# Patient Record
Sex: MALE | Race: WHITE | Employment: PART TIME | URBAN - METROPOLITAN AREA
[De-identification: names, ages, dates, MRNs, and addresses within clinical notes are randomized per-mention and may not be internally consistent; named-entity substitution may affect disease eponyms.]

---

## 2018-04-09 ENCOUNTER — OFFICE VISIT (OUTPATIENT)
Dept: CARDIOLOGY CLINIC | Age: 73
End: 2018-04-09
Payer: MEDICARE

## 2018-04-09 VITALS
SYSTOLIC BLOOD PRESSURE: 110 MMHG | HEIGHT: 68 IN | HEART RATE: 63 BPM | DIASTOLIC BLOOD PRESSURE: 70 MMHG | RESPIRATION RATE: 16 BRPM | WEIGHT: 193 LBS | BODY MASS INDEX: 29.25 KG/M2

## 2018-04-09 DIAGNOSIS — I25.10 CORONARY ARTERY DISEASE INVOLVING NATIVE CORONARY ARTERY OF NATIVE HEART WITHOUT ANGINA PECTORIS: Primary | Chronic | ICD-10-CM

## 2018-04-09 PROCEDURE — 99213 OFFICE O/P EST LOW 20 MIN: CPT | Performed by: INTERNAL MEDICINE

## 2018-04-09 PROCEDURE — 1036F TOBACCO NON-USER: CPT | Performed by: INTERNAL MEDICINE

## 2018-04-09 PROCEDURE — G8419 CALC BMI OUT NRM PARAM NOF/U: HCPCS | Performed by: INTERNAL MEDICINE

## 2018-04-09 PROCEDURE — 1123F ACP DISCUSS/DSCN MKR DOCD: CPT | Performed by: INTERNAL MEDICINE

## 2018-04-09 PROCEDURE — G8427 DOCREV CUR MEDS BY ELIG CLIN: HCPCS | Performed by: INTERNAL MEDICINE

## 2018-04-09 PROCEDURE — G8598 ASA/ANTIPLAT THER USED: HCPCS | Performed by: INTERNAL MEDICINE

## 2018-04-09 PROCEDURE — 93000 ELECTROCARDIOGRAM COMPLETE: CPT | Performed by: INTERNAL MEDICINE

## 2018-04-09 PROCEDURE — 4040F PNEUMOC VAC/ADMIN/RCVD: CPT | Performed by: INTERNAL MEDICINE

## 2018-04-09 PROCEDURE — 3017F COLORECTAL CA SCREEN DOC REV: CPT | Performed by: INTERNAL MEDICINE

## 2018-04-09 RX ORDER — NITROGLYCERIN 0.4 MG/1
0.4 TABLET SUBLINGUAL EVERY 5 MIN PRN
Qty: 25 TABLET | Refills: 3 | Status: SHIPPED | OUTPATIENT
Start: 2018-04-09 | End: 2019-05-13 | Stop reason: SDUPTHER

## 2018-04-09 RX ORDER — ASCORBIC ACID 500 MG
500 TABLET ORAL DAILY
COMMUNITY

## 2018-04-09 RX ORDER — CLOPIDOGREL BISULFATE 75 MG/1
75 TABLET ORAL DAILY
Qty: 90 TABLET | Refills: 3 | Status: SHIPPED | OUTPATIENT
Start: 2018-04-09 | End: 2019-02-11 | Stop reason: SDUPTHER

## 2018-04-09 RX ORDER — CARVEDILOL 3.12 MG/1
3.12 TABLET ORAL 2 TIMES DAILY WITH MEALS
Qty: 180 TABLET | Refills: 3 | Status: SHIPPED | OUTPATIENT
Start: 2018-04-09 | End: 2019-02-11 | Stop reason: SDUPTHER

## 2018-11-13 ENCOUNTER — OFFICE VISIT (OUTPATIENT)
Dept: CARDIOLOGY CLINIC | Age: 73
End: 2018-11-13
Payer: MEDICARE

## 2018-11-13 VITALS
DIASTOLIC BLOOD PRESSURE: 62 MMHG | HEIGHT: 68 IN | BODY MASS INDEX: 30.81 KG/M2 | HEART RATE: 65 BPM | WEIGHT: 203.3 LBS | RESPIRATION RATE: 18 BRPM | SYSTOLIC BLOOD PRESSURE: 108 MMHG

## 2018-11-13 DIAGNOSIS — I25.10 CORONARY ARTERY DISEASE INVOLVING NATIVE CORONARY ARTERY OF NATIVE HEART WITHOUT ANGINA PECTORIS: Primary | Chronic | ICD-10-CM

## 2018-11-13 PROCEDURE — 1123F ACP DISCUSS/DSCN MKR DOCD: CPT | Performed by: INTERNAL MEDICINE

## 2018-11-13 PROCEDURE — 93000 ELECTROCARDIOGRAM COMPLETE: CPT | Performed by: INTERNAL MEDICINE

## 2018-11-13 PROCEDURE — 1101F PT FALLS ASSESS-DOCD LE1/YR: CPT | Performed by: INTERNAL MEDICINE

## 2018-11-13 PROCEDURE — 4040F PNEUMOC VAC/ADMIN/RCVD: CPT | Performed by: INTERNAL MEDICINE

## 2018-11-13 PROCEDURE — 1036F TOBACCO NON-USER: CPT | Performed by: INTERNAL MEDICINE

## 2018-11-13 PROCEDURE — G8484 FLU IMMUNIZE NO ADMIN: HCPCS | Performed by: INTERNAL MEDICINE

## 2018-11-13 PROCEDURE — G8598 ASA/ANTIPLAT THER USED: HCPCS | Performed by: INTERNAL MEDICINE

## 2018-11-13 PROCEDURE — 99213 OFFICE O/P EST LOW 20 MIN: CPT | Performed by: INTERNAL MEDICINE

## 2018-11-13 PROCEDURE — G8427 DOCREV CUR MEDS BY ELIG CLIN: HCPCS | Performed by: INTERNAL MEDICINE

## 2018-11-13 PROCEDURE — G8417 CALC BMI ABV UP PARAM F/U: HCPCS | Performed by: INTERNAL MEDICINE

## 2018-11-13 PROCEDURE — 3017F COLORECTAL CA SCREEN DOC REV: CPT | Performed by: INTERNAL MEDICINE

## 2019-01-23 ENCOUNTER — HOSPITAL ENCOUNTER (OUTPATIENT)
Age: 74
Discharge: HOME | End: 2019-01-25
Payer: MEDICARE

## 2019-01-23 ENCOUNTER — HOSPITAL ENCOUNTER (OUTPATIENT)
Dept: GENERAL RADIOLOGY | Age: 74
Discharge: HOME | End: 2019-01-25
Payer: MEDICARE

## 2019-01-23 DIAGNOSIS — R05.9 COUGH: ICD-10-CM

## 2019-01-23 DIAGNOSIS — T78.40XA ALLERGY, INITIAL ENCOUNTER: ICD-10-CM

## 2019-01-23 PROCEDURE — 71046 X-RAY EXAM CHEST 2 VIEWS: CPT

## 2019-02-12 RX ORDER — CARVEDILOL 3.12 MG/1
TABLET ORAL
Qty: 180 TABLET | Refills: 3 | Status: SHIPPED | OUTPATIENT
Start: 2019-02-12 | End: 2019-12-02 | Stop reason: SDUPTHER

## 2019-02-12 RX ORDER — CLOPIDOGREL BISULFATE 75 MG/1
TABLET ORAL
Qty: 90 TABLET | Refills: 3 | Status: SHIPPED | OUTPATIENT
Start: 2019-02-12 | End: 2019-12-02 | Stop reason: SDUPTHER

## 2019-05-13 ENCOUNTER — OFFICE VISIT (OUTPATIENT)
Dept: CARDIOLOGY CLINIC | Age: 74
End: 2019-05-13
Payer: MEDICARE

## 2019-05-13 VITALS
BODY MASS INDEX: 31.78 KG/M2 | HEART RATE: 58 BPM | DIASTOLIC BLOOD PRESSURE: 78 MMHG | RESPIRATION RATE: 16 BRPM | WEIGHT: 209.7 LBS | HEIGHT: 68 IN | SYSTOLIC BLOOD PRESSURE: 128 MMHG

## 2019-05-13 DIAGNOSIS — I25.119 CORONARY ARTERY DISEASE WITH ANGINA PECTORIS, UNSPECIFIED VESSEL OR LESION TYPE, UNSPECIFIED WHETHER NATIVE OR TRANSPLANTED HEART (HCC): Primary | Chronic | ICD-10-CM

## 2019-05-13 PROCEDURE — 99213 OFFICE O/P EST LOW 20 MIN: CPT | Performed by: INTERNAL MEDICINE

## 2019-05-13 PROCEDURE — 1036F TOBACCO NON-USER: CPT | Performed by: INTERNAL MEDICINE

## 2019-05-13 PROCEDURE — G8417 CALC BMI ABV UP PARAM F/U: HCPCS | Performed by: INTERNAL MEDICINE

## 2019-05-13 PROCEDURE — 1123F ACP DISCUSS/DSCN MKR DOCD: CPT | Performed by: INTERNAL MEDICINE

## 2019-05-13 PROCEDURE — G8427 DOCREV CUR MEDS BY ELIG CLIN: HCPCS | Performed by: INTERNAL MEDICINE

## 2019-05-13 PROCEDURE — G8598 ASA/ANTIPLAT THER USED: HCPCS | Performed by: INTERNAL MEDICINE

## 2019-05-13 PROCEDURE — 93000 ELECTROCARDIOGRAM COMPLETE: CPT | Performed by: INTERNAL MEDICINE

## 2019-05-13 PROCEDURE — 4040F PNEUMOC VAC/ADMIN/RCVD: CPT | Performed by: INTERNAL MEDICINE

## 2019-05-13 PROCEDURE — 3017F COLORECTAL CA SCREEN DOC REV: CPT | Performed by: INTERNAL MEDICINE

## 2019-05-13 RX ORDER — NITROGLYCERIN 0.4 MG/1
0.4 TABLET SUBLINGUAL EVERY 5 MIN PRN
Qty: 25 TABLET | Refills: 3 | Status: SHIPPED
Start: 2019-05-13 | End: 2020-09-08 | Stop reason: SDUPTHER

## 2019-05-13 NOTE — PROGRESS NOTES
Relationship status: None    Intimate partner violence:     Fear of current or ex partner: None     Emotionally abused: None     Physically abused: None     Forced sexual activity: None   Other Topics Concern    None   Social History Narrative    None       Current Outpatient Medications   Medication Sig Dispense Refill    nitroGLYCERIN (NITROSTAT) 0.4 MG SL tablet Place 1 tablet under the tongue every 5 minutes as needed (CP) 25 tablet 3    clopidogrel (PLAVIX) 75 MG tablet TAKE 1 TABLET EVERY DAY 90 tablet 3    carvedilol (COREG) 3.125 MG tablet TAKE 1 TABLET TWICE DAILY WITH MEALS 180 tablet 3    vitamin C (ASCORBIC ACID) 500 MG tablet Take 500 mg by mouth daily      Lactobacillus (PROBIOTIC ACIDOPHILUS PO) Take by mouth      vitamin D (CHOLECALCIFEROL) 1000 UNIT TABS tablet Take 1,000 Units by mouth daily      aspirin 81 MG tablet Take 1 tablet by mouth daily LD 2/12/16 per surgeon (Patient taking differently: Take 81 mg by mouth daily Takes nightly) 30 tablet 0    Multiple Vitamin TABS Take by mouth      LIPITOR 40 MG tablet TAKE ONE (1) TABLET ONCE DAILY. 90 tablet 3    Flaxseed, Linseed, (FLAX SEED OIL PO) Take 1,000 mg by mouth 2 times daily LD 2/15/16      doxycycline hyclate (VIBRA-TABS) 100 MG tablet TAKE ONE TABLET BY MOUTH TWO TIMES A DAY  0    vitamin E 1000 UNITS capsule Take 1,000 Units by mouth daily LD 2/15/16       No current facility-administered medications for this visit. No Known Allergies    Chief Complaint:  Jailene Velez is here today for follow up and management/recomendations for CAD, ICM, hypercholesterolemia. History of Present Illness: Jailene Velez states that He does house work, yard work, goes up the stairs & goes shopping. He also walks his dog for a mile. He denies any CP or OLSON with any of these activities. He denies any orthopnea/PND. He denies any presyncopal symptoms.   He had one episode 2 months ago where after carrying a 60 pound bag 30 patient's care.       [address], [address] [facility]  Interventional Cardiology

## 2019-05-29 ENCOUNTER — HOSPITAL ENCOUNTER (OUTPATIENT)
Dept: GENERAL RADIOLOGY | Age: 74
Discharge: HOME | End: 2019-05-31
Payer: MEDICARE

## 2019-05-29 ENCOUNTER — HOSPITAL ENCOUNTER (OUTPATIENT)
Dept: ULTRASOUND IMAGING | Age: 74
Discharge: HOME | End: 2019-05-31
Payer: MEDICARE

## 2019-05-29 ENCOUNTER — HOSPITAL ENCOUNTER (OUTPATIENT)
Age: 74
Discharge: HOME | End: 2019-05-31
Payer: MEDICARE

## 2019-05-29 DIAGNOSIS — R60.0 LOCALIZED EDEMA: ICD-10-CM

## 2019-05-29 DIAGNOSIS — M79.605 LEFT LEG PAIN: ICD-10-CM

## 2019-05-29 DIAGNOSIS — S80.12XA CONTUSION OF LEFT LOWER EXTREMITY, INITIAL ENCOUNTER: ICD-10-CM

## 2019-05-29 DIAGNOSIS — M79.605 PAIN OF LEFT LEG: ICD-10-CM

## 2019-05-29 PROCEDURE — 73590 X-RAY EXAM OF LOWER LEG: CPT

## 2019-05-29 PROCEDURE — 93971 EXTREMITY STUDY: CPT

## 2019-10-30 ENCOUNTER — TELEPHONE (OUTPATIENT)
Dept: ADMINISTRATIVE | Age: 74
End: 2019-10-30

## 2019-12-02 RX ORDER — CARVEDILOL 3.12 MG/1
TABLET ORAL
Qty: 180 TABLET | Refills: 3 | Status: SHIPPED
Start: 2019-12-02 | End: 2020-09-16

## 2019-12-02 RX ORDER — CLOPIDOGREL BISULFATE 75 MG/1
TABLET ORAL
Qty: 90 TABLET | Refills: 3 | Status: SHIPPED
Start: 2019-12-02 | End: 2020-09-16

## 2019-12-12 ENCOUNTER — OFFICE VISIT (OUTPATIENT)
Dept: CARDIOLOGY CLINIC | Age: 74
End: 2019-12-12
Payer: MEDICARE

## 2019-12-12 VITALS
HEART RATE: 64 BPM | WEIGHT: 205.2 LBS | RESPIRATION RATE: 18 BRPM | BODY MASS INDEX: 31.1 KG/M2 | DIASTOLIC BLOOD PRESSURE: 70 MMHG | SYSTOLIC BLOOD PRESSURE: 116 MMHG | HEIGHT: 68 IN

## 2019-12-12 DIAGNOSIS — I25.119 CORONARY ARTERY DISEASE WITH ANGINA PECTORIS, UNSPECIFIED VESSEL OR LESION TYPE, UNSPECIFIED WHETHER NATIVE OR TRANSPLANTED HEART (HCC): Primary | Chronic | ICD-10-CM

## 2019-12-12 PROCEDURE — G8484 FLU IMMUNIZE NO ADMIN: HCPCS | Performed by: INTERNAL MEDICINE

## 2019-12-12 PROCEDURE — G8427 DOCREV CUR MEDS BY ELIG CLIN: HCPCS | Performed by: INTERNAL MEDICINE

## 2019-12-12 PROCEDURE — 3017F COLORECTAL CA SCREEN DOC REV: CPT | Performed by: INTERNAL MEDICINE

## 2019-12-12 PROCEDURE — 1123F ACP DISCUSS/DSCN MKR DOCD: CPT | Performed by: INTERNAL MEDICINE

## 2019-12-12 PROCEDURE — G8598 ASA/ANTIPLAT THER USED: HCPCS | Performed by: INTERNAL MEDICINE

## 2019-12-12 PROCEDURE — G8417 CALC BMI ABV UP PARAM F/U: HCPCS | Performed by: INTERNAL MEDICINE

## 2019-12-12 PROCEDURE — 1036F TOBACCO NON-USER: CPT | Performed by: INTERNAL MEDICINE

## 2019-12-12 PROCEDURE — 99213 OFFICE O/P EST LOW 20 MIN: CPT | Performed by: INTERNAL MEDICINE

## 2019-12-12 PROCEDURE — 93000 ELECTROCARDIOGRAM COMPLETE: CPT | Performed by: INTERNAL MEDICINE

## 2019-12-12 PROCEDURE — 4040F PNEUMOC VAC/ADMIN/RCVD: CPT | Performed by: INTERNAL MEDICINE

## 2020-09-08 ENCOUNTER — OFFICE VISIT (OUTPATIENT)
Dept: CARDIOLOGY CLINIC | Age: 75
End: 2020-09-08
Payer: MEDICARE

## 2020-09-08 VITALS
BODY MASS INDEX: 30.62 KG/M2 | HEIGHT: 68 IN | HEART RATE: 65 BPM | RESPIRATION RATE: 16 BRPM | WEIGHT: 202 LBS | DIASTOLIC BLOOD PRESSURE: 64 MMHG | SYSTOLIC BLOOD PRESSURE: 102 MMHG

## 2020-09-08 PROCEDURE — 1123F ACP DISCUSS/DSCN MKR DOCD: CPT | Performed by: INTERNAL MEDICINE

## 2020-09-08 PROCEDURE — G8417 CALC BMI ABV UP PARAM F/U: HCPCS | Performed by: INTERNAL MEDICINE

## 2020-09-08 PROCEDURE — G8427 DOCREV CUR MEDS BY ELIG CLIN: HCPCS | Performed by: INTERNAL MEDICINE

## 2020-09-08 PROCEDURE — 3017F COLORECTAL CA SCREEN DOC REV: CPT | Performed by: INTERNAL MEDICINE

## 2020-09-08 PROCEDURE — 4040F PNEUMOC VAC/ADMIN/RCVD: CPT | Performed by: INTERNAL MEDICINE

## 2020-09-08 PROCEDURE — 99214 OFFICE O/P EST MOD 30 MIN: CPT | Performed by: INTERNAL MEDICINE

## 2020-09-08 PROCEDURE — 1036F TOBACCO NON-USER: CPT | Performed by: INTERNAL MEDICINE

## 2020-09-08 PROCEDURE — 93000 ELECTROCARDIOGRAM COMPLETE: CPT | Performed by: INTERNAL MEDICINE

## 2020-09-08 RX ORDER — NITROGLYCERIN 0.4 MG/1
0.4 TABLET SUBLINGUAL EVERY 5 MIN PRN
Qty: 25 TABLET | Refills: 3 | Status: SHIPPED
Start: 2020-09-08 | End: 2021-05-05

## 2020-09-08 NOTE — PROGRESS NOTES
Nehemiah Lucas [facility]  1945  Date of Service: 9/8/2020    Patient Active Problem List    Diagnosis Date Noted    Mixed hyperlipidemia 06/04/2013    CAD (coronary artery disease)      Overview Note:     A. Non Q-wave inferoposterior myocardial infarction (6/7/08). B. Cardiac catheterization (6/7/08). LAD proximal 80%, distal 99%. D1 1.8 mm vessel ostial 99%. Circumflex distal 80%. OM 3 ostial 100%. RCA distal 99% at the bifurcation of PDA posterior lateral branch. C. Urgent coronary artery bypass surgery by Dr. Shilpi Fan (6/7/08). LIMA to the LAD. Heart cath (11-12-12)  LIMA - LAD patent  SVG - OM2: patent  SVG - PLB: occluded  SVG - PDA: occluded  Apical LAD: 85 & 95% lesions (1.5 mm vessel)    2.5x14 Resolute MARQUES distal RCA  SVG to the OM3. SVG to the PDA. SVG to the posterior lateral branch. 6-13 cath stent patent      Ischemic cardiomyopathy      Overview Note:     Lateral, but preserved LV systolic function         Social History     Socioeconomic History    Marital status:      Spouse name: None    Number of children: None    Years of education: None    Highest education level: None   Occupational History    None   Social Needs    Financial resource strain: None    Food insecurity     Worry: None     Inability: None    Transportation needs     Medical: None     Non-medical: None   Tobacco Use    Smoking status: Never Smoker    Smokeless tobacco: Never Used   Substance and Sexual Activity    Alcohol use:  Yes     Alcohol/week: 1.0 standard drinks     Types: 1 Cans of beer per week     Comment: socially    Drug use: No    Sexual activity: Yes   Lifestyle    Physical activity     Days per week: None     Minutes per session: None    Stress: None   Relationships    Social connections     Talks on phone: None     Gets together: None     Attends Tenriism service: None     Active member of club or organization: None     Attends meetings of clubs or organizations: None Relationship status: None    Intimate partner violence     Fear of current or ex partner: None     Emotionally abused: None     Physically abused: None     Forced sexual activity: None   Other Topics Concern    None   Social History Narrative    None       Current Outpatient Medications   Medication Sig Dispense Refill    clopidogrel (PLAVIX) 75 MG tablet TAKE 1 TABLET EVERY DAY 90 tablet 3    carvedilol (COREG) 3.125 MG tablet TAKE 1 TABLET TWICE DAILY WITH MEALS 180 tablet 3    nitroGLYCERIN (NITROSTAT) 0.4 MG SL tablet Place 1 tablet under the tongue every 5 minutes as needed (CP) 25 tablet 3    vitamin C (ASCORBIC ACID) 500 MG tablet Take 500 mg by mouth daily      Lactobacillus (PROBIOTIC ACIDOPHILUS PO) Take by mouth      vitamin D (CHOLECALCIFEROL) 1000 UNIT TABS tablet Take 1,000 Units by mouth daily      aspirin 81 MG tablet Take 1 tablet by mouth daily LD 2/12/16 per surgeon (Patient taking differently: Take 81 mg by mouth daily Takes nightly) 30 tablet 0    Multiple Vitamin TABS Take by mouth      LIPITOR 40 MG tablet TAKE ONE (1) TABLET ONCE DAILY. 90 tablet 3    Flaxseed, Linseed, (FLAX SEED OIL PO) Take 1,000 mg by mouth 2 times daily LD 2/15/16      doxycycline hyclate (VIBRA-TABS) 100 MG tablet TAKE ONE TABLET BY MOUTH TWO TIMES A DAY  0    vitamin E 1000 UNITS capsule Take 1,000 Units by mouth daily LD 2/15/16       No current facility-administered medications for this visit. No Known Allergies    Chief Complaint:  Jerry Webb is here today for follow up and management/recomendations for CAD, ICM, hypercholesterolemia. History of Present Illness: Jerry Webb states that He does house work, yard work, goes up the stairs & goes shopping. He also walks his dog. He denies any CP with any of these activities.   He is concerned that intermittently he needs to take a deep breath at the top of the stairs or when doing certain activities especially if bending over he agree.      Thank you for allowing me to participate in your patient's care.       [address], [address] [facility]  Interventional Cardiology

## 2020-09-14 ENCOUNTER — TELEPHONE (OUTPATIENT)
Dept: CARDIOLOGY | Age: 75
End: 2020-09-14

## 2020-09-14 NOTE — TELEPHONE ENCOUNTER
SCHEDULED ECHO FOR 09-22-20. REVIEWED COVID CHECKLIST WITH PATIENT.     Electronically signed by Deven Solano on 9/14/2020 at 3:00 PM

## 2020-09-16 RX ORDER — CARVEDILOL 3.12 MG/1
TABLET ORAL
Qty: 180 TABLET | Refills: 3 | Status: SHIPPED
Start: 2020-09-16 | End: 2021-12-06

## 2020-09-16 RX ORDER — CLOPIDOGREL BISULFATE 75 MG/1
TABLET ORAL
Qty: 90 TABLET | Refills: 3 | Status: SHIPPED
Start: 2020-09-16 | End: 2021-12-06

## 2020-09-22 ENCOUNTER — TELEPHONE (OUTPATIENT)
Dept: CARDIOLOGY CLINIC | Age: 75
End: 2020-09-22

## 2020-09-22 ENCOUNTER — HOSPITAL ENCOUNTER (OUTPATIENT)
Dept: CARDIOLOGY | Age: 75
Discharge: HOME | End: 2020-09-22
Payer: MEDICARE

## 2020-09-22 LAB
LV EF: 60 %
LVEF MODALITY: NORMAL

## 2020-09-22 PROCEDURE — 93306 TTE W/DOPPLER COMPLETE: CPT

## 2020-09-22 NOTE — TELEPHONE ENCOUNTER
----- Message from Charlies Kayser, DO sent at 9/22/2020  4:33 PM EDT -----  I discussed with him his echo results.

## 2021-02-23 ENCOUNTER — TELEPHONE (OUTPATIENT)
Dept: CARDIOLOGY CLINIC | Age: 76
End: 2021-02-23

## 2021-02-23 DIAGNOSIS — R07.9 CHEST PAIN, UNSPECIFIED TYPE: Primary | ICD-10-CM

## 2021-02-23 NOTE — TELEPHONE ENCOUNTER
Exercise Cardiolite stress test.  Follow-up with me next available.   Go to the emergency room if he has another episode of chest discomfort before he can get his stress test.

## 2021-02-23 NOTE — TELEPHONE ENCOUNTER
Patient notified of Dr. Margaret Ambriz recommendations. Patient states he understands and will comply. Order placed for stress. F/U scheduled for 3/4/21 at 9:40 a.m.

## 2021-03-03 ENCOUNTER — HOSPITAL ENCOUNTER (OUTPATIENT)
Dept: CARDIOLOGY | Age: 76
Discharge: HOME | End: 2021-03-03
Payer: MEDICARE

## 2021-03-03 ENCOUNTER — TELEPHONE (OUTPATIENT)
Dept: CARDIOLOGY CLINIC | Age: 76
End: 2021-03-03

## 2021-03-03 VITALS
HEART RATE: 52 BPM | BODY MASS INDEX: 29.55 KG/M2 | HEIGHT: 68 IN | TEMPERATURE: 97.9 F | WEIGHT: 195 LBS | DIASTOLIC BLOOD PRESSURE: 68 MMHG | RESPIRATION RATE: 18 BRPM | SYSTOLIC BLOOD PRESSURE: 110 MMHG

## 2021-03-03 DIAGNOSIS — R07.9 CHEST PAIN, UNSPECIFIED TYPE: Primary | ICD-10-CM

## 2021-03-03 DIAGNOSIS — R07.9 CHEST PAIN, UNSPECIFIED TYPE: ICD-10-CM

## 2021-03-03 LAB
LV EF: 61 %
LVEF MODALITY: NORMAL

## 2021-03-03 PROCEDURE — 93017 CV STRESS TEST TRACING ONLY: CPT

## 2021-03-03 PROCEDURE — 3430000000 HC RX DIAGNOSTIC RADIOPHARMACEUTICAL: Performed by: INTERNAL MEDICINE

## 2021-03-03 PROCEDURE — A9500 TC99M SESTAMIBI: HCPCS | Performed by: INTERNAL MEDICINE

## 2021-03-03 PROCEDURE — 2580000003 HC RX 258: Performed by: INTERNAL MEDICINE

## 2021-03-03 PROCEDURE — 78452 HT MUSCLE IMAGE SPECT MULT: CPT

## 2021-03-03 RX ORDER — SODIUM CHLORIDE 0.9 % (FLUSH) 0.9 %
10 SYRINGE (ML) INJECTION PRN
Status: DISCONTINUED | OUTPATIENT
Start: 2021-03-03 | End: 2021-03-04 | Stop reason: HOSPADM

## 2021-03-03 RX ADMIN — SODIUM CHLORIDE, PRESERVATIVE FREE 10 ML: 5 INJECTION INTRAVENOUS at 07:36

## 2021-03-03 RX ADMIN — SODIUM CHLORIDE, PRESERVATIVE FREE 10 ML: 5 INJECTION INTRAVENOUS at 09:44

## 2021-03-03 RX ADMIN — Medication 10.4 MILLICURIE: at 07:35

## 2021-03-03 RX ADMIN — Medication 30.4 MILLICURIE: at 09:44

## 2021-03-03 NOTE — PROCEDURES
[address] [facility] [address]  [phone]                Exercise Stress Nuclear Gated SPECT Study    Name: Brenna Sanchez Account Number: [de-identified]    :  1945      Sex: male              Date of Study:  3/3/2021    Height: 5' 8\" (172.7 cm)  Weight: 195 lb (88.5 kg)     Ordering Provider: Armando Hook DO          PCP: Raymond Styles MD      Cardiologist: Armando Hook DO                        Interpreting Physician: David Hartman MD  _________________________________________________________________________________    Indication:   Evaluation of extent and severity of coronary artery disease    Clinical History:   Patient has prior history of coronary artery disease. Resting ECG:    LA int .20 sec, QRS int .08 sec, QT int . 40 sec; HR 52 bpm  Normal sinus rhythm, Nonspecific ST-T wave changes and possible remote inferior MI    Exercise: The patient exercised using a Savage protocol, completing 6:20 minutes and reaching an estimated work load of 7.0 metabolic equivalents (METS). Resting HR was 52. Peak exercise heart rate was 134 ( 91% of maximum predicted heart rate for age). Baseline /68. Peak exercise /84. The blood pressure response to exercise was normal      Exercise was terminated due to heart rate attained and dyspnea. The patient experienced no chest pain with exercise. Exercise ECG:   The patient demonstrated occasional PAC's during exercise. With exercise, there were no ST segment changes of significance at the heart rate achieved. Shetty treadmill score was 6 implying low risk. IMAGING: Myocardial perfusion imaging was performed at rest 30-35 minutes following the intravenous injection of 10.4 mCi of (Tc-Sestamibi) followed by 10 ml of Normal Saline.   At peak exercise, the patient was injected intravenously with 30.4 mCi of (Tc-Sestamibi) followed by 10 ml of Normal Saline. Gated post-stress tomographic imaging was performed 20-25 minutes after stress. FINDINGS: The overall quality of the study was good. Left ventricular cavity size was noted to be normal.    Rotational analog analysis demonstrated no patient motion or abnormal extracardiac radioactivity. A moderate defect was present in the basal inferolateral wall(s) that was  small sized by quantification. The resting images are normal.     Gated SPECT left ventricular ejection fraction was calculated to be 61%, with normal myocardial thickening and wall motion. Impression:      1. The patient experienced no chest pain with exercise. Exercise EKG was negative. The myocardial perfusion imaging was abnormal. The abnormality was a a small sized completely reversible defect in the basal inferolateral wall. Overrall left ventricular systolic function was normal without regional wall motion abnormalities. 2. Shetty treadmill score was 6 implying low risk. 3. Exercise capacity was average. 4. Low risk general exercise treadmill test.    Thank you for sending your patient to this [facility].      Electronically signed by Tarah Hidalgo MD on 3/3/21 at 12:55 PM EST

## 2021-03-04 ENCOUNTER — OFFICE VISIT (OUTPATIENT)
Dept: CARDIOLOGY CLINIC | Age: 76
End: 2021-03-04
Payer: MEDICARE

## 2021-03-04 VITALS
HEIGHT: 68 IN | HEART RATE: 67 BPM | BODY MASS INDEX: 29.55 KG/M2 | DIASTOLIC BLOOD PRESSURE: 60 MMHG | WEIGHT: 195 LBS | SYSTOLIC BLOOD PRESSURE: 112 MMHG | RESPIRATION RATE: 16 BRPM

## 2021-03-04 DIAGNOSIS — I25.119 CORONARY ARTERY DISEASE WITH ANGINA PECTORIS, UNSPECIFIED VESSEL OR LESION TYPE, UNSPECIFIED WHETHER NATIVE OR TRANSPLANTED HEART (HCC): Primary | Chronic | ICD-10-CM

## 2021-03-04 PROCEDURE — 1036F TOBACCO NON-USER: CPT | Performed by: INTERNAL MEDICINE

## 2021-03-04 PROCEDURE — 99214 OFFICE O/P EST MOD 30 MIN: CPT | Performed by: INTERNAL MEDICINE

## 2021-03-04 PROCEDURE — G8484 FLU IMMUNIZE NO ADMIN: HCPCS | Performed by: INTERNAL MEDICINE

## 2021-03-04 PROCEDURE — 4040F PNEUMOC VAC/ADMIN/RCVD: CPT | Performed by: INTERNAL MEDICINE

## 2021-03-04 PROCEDURE — 93000 ELECTROCARDIOGRAM COMPLETE: CPT | Performed by: INTERNAL MEDICINE

## 2021-03-04 PROCEDURE — 3017F COLORECTAL CA SCREEN DOC REV: CPT | Performed by: INTERNAL MEDICINE

## 2021-03-04 PROCEDURE — 1123F ACP DISCUSS/DSCN MKR DOCD: CPT | Performed by: INTERNAL MEDICINE

## 2021-03-04 PROCEDURE — G8417 CALC BMI ABV UP PARAM F/U: HCPCS | Performed by: INTERNAL MEDICINE

## 2021-03-04 PROCEDURE — G8427 DOCREV CUR MEDS BY ELIG CLIN: HCPCS | Performed by: INTERNAL MEDICINE

## 2021-03-04 RX ORDER — FLUTICASONE PROPIONATE 50 MCG
SPRAY, SUSPENSION (ML) NASAL PRN
COMMUNITY
Start: 2021-03-01

## 2021-03-04 RX ORDER — ATORVASTATIN CALCIUM 20 MG/1
20 TABLET, FILM COATED ORAL DAILY
COMMUNITY
Start: 2021-02-10

## 2021-03-04 RX ORDER — RANOLAZINE 500 MG/1
500 TABLET, EXTENDED RELEASE ORAL 2 TIMES DAILY
Qty: 180 TABLET | Refills: 3 | Status: SHIPPED
Start: 2021-03-04 | End: 2021-03-04 | Stop reason: SDUPTHER

## 2021-03-04 RX ORDER — RANOLAZINE 500 MG/1
500 TABLET, EXTENDED RELEASE ORAL 2 TIMES DAILY
Qty: 180 TABLET | Refills: 3 | Status: SHIPPED
Start: 2021-03-04 | End: 2021-03-25 | Stop reason: SINTOL

## 2021-03-04 NOTE — PROGRESS NOTES
Sabas Barillas [facility]  1945  Date of Service: 3/4/2021    Patient Active Problem List    Diagnosis Date Noted    Mixed hyperlipidemia 06/04/2013    CAD (coronary artery disease)      Overview Note:     A. Non Q-wave inferoposterior myocardial infarction (6/7/08). B. Cardiac catheterization (6/7/08). LAD proximal 80%, distal 99%. D1 1.8 mm vessel ostial 99%. Circumflex distal 80%. OM 3 ostial 100%. RCA distal 99% at the bifurcation of PDA posterior lateral branch. C. Urgent coronary artery bypass surgery by Dr. Domenica Israel (6/7/08). LIMA to the LAD. Heart cath (11-12-12)  LIMA - LAD patent  SVG - OM2: patent  SVG - PLB: occluded  SVG - PDA: occluded  Apical LAD: 85 & 95% lesions (1.5 mm vessel)    2.5x14 Resolute MARQUES distal RCA  SVG to the OM3. SVG to the PDA. SVG to the posterior lateral branch. 6-13 cath stent patent      Ischemic cardiomyopathy      Overview Note:     Lateral, but preserved LV systolic function         Social History     Socioeconomic History    Marital status:      Spouse name: None    Number of children: None    Years of education: None    Highest education level: None   Occupational History    None   Social Needs    Financial resource strain: None    Food insecurity     Worry: None     Inability: None    Transportation needs     Medical: None     Non-medical: None   Tobacco Use    Smoking status: Never Smoker    Smokeless tobacco: Never Used   Substance and Sexual Activity    Alcohol use:  Yes     Alcohol/week: 1.0 standard drinks     Types: 1 Cans of beer per week     Comment: socially    Drug use: No    Sexual activity: Yes   Lifestyle    Physical activity     Days per week: None     Minutes per session: None    Stress: None   Relationships    Social connections     Talks on phone: None     Gets together: None     Attends Gnosticism service: None     Active member of club or organization: None     Attends meetings of clubs or organizations: None Relationship status: None    Intimate partner violence     Fear of current or ex partner: None     Emotionally abused: None     Physically abused: None     Forced sexual activity: None   Other Topics Concern    None   Social History Narrative    None       Current Outpatient Medications   Medication Sig Dispense Refill    atorvastatin (LIPITOR) 20 MG tablet 20 mg daily       fluticasone (FLONASE) 50 MCG/ACT nasal spray INSTILL 1 SPRAY TO EACH NOSTRIL TWICE A DAY      ranolazine (RANEXA) 500 MG extended release tablet Take 1 tablet by mouth 2 times daily 180 tablet 3    carvedilol (COREG) 3.125 MG tablet TAKE 1 TABLET TWICE DAILY WITH MEALS 180 tablet 3    clopidogrel (PLAVIX) 75 MG tablet TAKE 1 TABLET EVERY DAY 90 tablet 3    nitroGLYCERIN (NITROSTAT) 0.4 MG SL tablet Place 1 tablet under the tongue every 5 minutes as needed (CP) 25 tablet 3    vitamin C (ASCORBIC ACID) 500 MG tablet Take 500 mg by mouth daily      Lactobacillus (PROBIOTIC ACIDOPHILUS PO) Take by mouth      vitamin D (CHOLECALCIFEROL) 1000 UNIT TABS tablet Take 1,000 Units by mouth daily      aspirin 81 MG tablet Take 1 tablet by mouth daily LD 2/12/16 per surgeon (Patient taking differently: Take 81 mg by mouth daily Takes nightly) 30 tablet 0    Multiple Vitamin TABS Take by mouth      vitamin E 1000 UNITS capsule Take 1,000 Units by mouth daily LD 2/15/16      Flaxseed, Linseed, (FLAX SEED OIL PO) Take 1,000 mg by mouth 2 times daily LD 2/15/16       No current facility-administered medications for this visit. No Known Allergies    Chief Complaint:  Norm Otto is here today for follow up and management/recomendations for CAD, ICM, hypercholesterolemia. History of Present Illness: Norm Otto states that He does house work, yard work, goes up the stairs & goes shopping. He also performs strenuous activities in the woods and in the barn. he denies any CP or dyspnea with any of these activities.   However, he described above. 3. Recent abnormal but low risk stress test with a small area of reversibility in the inferior lateral wall. 4. Cardiomyopathy as outlined above. No decompensation at this time. 5. Hypercholesterolemia      Recommendations:  1. He is following the cholesterol with Dr. Jenna Ward. 2. Mild symptoms with a low risk abnormal stress test as described above. I discussed with him a cardiac catheterization versus medical therapy. After our discussion he agrees to try medical therapy. His blood pressure is only 112/60. Therefore I will not add Imdur. I will add Ranexa 500 mg twice daily. Thank you for allowing me to participate in your patient's care.       [address], [address] [facility]  Interventional Cardiology

## 2021-03-24 ENCOUNTER — TELEPHONE (OUTPATIENT)
Dept: CARDIOLOGY CLINIC | Age: 76
End: 2021-03-24

## 2021-03-24 NOTE — TELEPHONE ENCOUNTER
Spoke with patient to schedule May F/U. He statse he had to stop Ranexa due to multiple side effects. He saw Dr. Francheska Davey who started him on a steroid and inhalers and his chest pain resolved.

## 2021-05-05 ENCOUNTER — OFFICE VISIT (OUTPATIENT)
Dept: CARDIOLOGY CLINIC | Age: 76
End: 2021-05-05
Payer: MEDICARE

## 2021-05-05 VITALS
HEART RATE: 63 BPM | WEIGHT: 200 LBS | BODY MASS INDEX: 30.31 KG/M2 | SYSTOLIC BLOOD PRESSURE: 120 MMHG | HEIGHT: 68 IN | RESPIRATION RATE: 16 BRPM | DIASTOLIC BLOOD PRESSURE: 78 MMHG

## 2021-05-05 DIAGNOSIS — I25.119 CORONARY ARTERY DISEASE WITH ANGINA PECTORIS, UNSPECIFIED VESSEL OR LESION TYPE, UNSPECIFIED WHETHER NATIVE OR TRANSPLANTED HEART (HCC): Primary | ICD-10-CM

## 2021-05-05 PROCEDURE — 4040F PNEUMOC VAC/ADMIN/RCVD: CPT | Performed by: INTERNAL MEDICINE

## 2021-05-05 PROCEDURE — 99213 OFFICE O/P EST LOW 20 MIN: CPT | Performed by: INTERNAL MEDICINE

## 2021-05-05 PROCEDURE — G8427 DOCREV CUR MEDS BY ELIG CLIN: HCPCS | Performed by: INTERNAL MEDICINE

## 2021-05-05 PROCEDURE — 1123F ACP DISCUSS/DSCN MKR DOCD: CPT | Performed by: INTERNAL MEDICINE

## 2021-05-05 PROCEDURE — 93000 ELECTROCARDIOGRAM COMPLETE: CPT | Performed by: INTERNAL MEDICINE

## 2021-05-05 PROCEDURE — 1036F TOBACCO NON-USER: CPT | Performed by: INTERNAL MEDICINE

## 2021-05-05 PROCEDURE — G8417 CALC BMI ABV UP PARAM F/U: HCPCS | Performed by: INTERNAL MEDICINE

## 2021-05-05 PROCEDURE — 3017F COLORECTAL CA SCREEN DOC REV: CPT | Performed by: INTERNAL MEDICINE

## 2021-05-05 RX ORDER — NITROGLYCERIN 0.4 MG/1
0.4 TABLET SUBLINGUAL EVERY 5 MIN PRN
Qty: 25 TABLET | Refills: 3 | Status: SHIPPED
Start: 2021-05-05 | End: 2022-01-25 | Stop reason: SDUPTHER

## 2021-05-05 NOTE — PROGRESS NOTES
Lennox Joyner  1945  Date of Service: 5/5/2021    Patient Active Problem List    Diagnosis Date Noted    Mixed hyperlipidemia 06/04/2013    CAD (coronary artery disease)      Overview Note:     A. Non Q-wave inferoposterior myocardial infarction (6/7/08). B. Cardiac catheterization (6/7/08). LAD proximal 80%, distal 99%. D1 1.8 mm vessel ostial 99%. Circumflex distal 80%. OM 3 ostial 100%. RCA distal 99% at the bifurcation of PDA posterior lateral branch. C. Urgent coronary artery bypass surgery by Dr. Edelmira Canavan (6/7/08). LIMA to the LAD. Heart cath (11-12-12)  LIMA - LAD patent  SVG - OM2: patent  SVG - PLB: occluded  SVG - PDA: occluded  Apical LAD: 85 & 95% lesions (1.5 mm vessel)    2.5x14 Resolute MARQUES distal RCA  SVG to the OM3. SVG to the PDA. SVG to the posterior lateral branch. 6-13 cath stent patent      Ischemic cardiomyopathy      Overview Note:     Lateral, but preserved LV systolic function         Social History     Socioeconomic History    Marital status:      Spouse name: None    Number of children: None    Years of education: None    Highest education level: None   Occupational History    None   Social Needs    Financial resource strain: None    Food insecurity     Worry: None     Inability: None    Transportation needs     Medical: None     Non-medical: None   Tobacco Use    Smoking status: Never Smoker    Smokeless tobacco: Never Used   Substance and Sexual Activity    Alcohol use:  Yes     Alcohol/week: 1.0 standard drinks     Types: 1 Cans of beer per week     Comment: socially    Drug use: No    Sexual activity: Yes   Lifestyle    Physical activity     Days per week: None     Minutes per session: None    Stress: None   Relationships    Social connections     Talks on phone: None     Gets together: None     Attends Mandaen service: None     Active member of club or organization: None     Attends meetings of clubs or organizations: None Relationship status: None    Intimate partner violence     Fear of current or ex partner: None     Emotionally abused: None     Physically abused: None     Forced sexual activity: None   Other Topics Concern    None   Social History Narrative    None       Current Outpatient Medications   Medication Sig Dispense Refill    atorvastatin (LIPITOR) 20 MG tablet 20 mg daily       fluticasone (FLONASE) 50 MCG/ACT nasal spray as needed       carvedilol (COREG) 3.125 MG tablet TAKE 1 TABLET TWICE DAILY WITH MEALS 180 tablet 3    clopidogrel (PLAVIX) 75 MG tablet TAKE 1 TABLET EVERY DAY 90 tablet 3    nitroGLYCERIN (NITROSTAT) 0.4 MG SL tablet Place 1 tablet under the tongue every 5 minutes as needed (CP) 25 tablet 3    vitamin C (ASCORBIC ACID) 500 MG tablet Take 500 mg by mouth daily      Lactobacillus (PROBIOTIC ACIDOPHILUS PO) Take by mouth      vitamin D (CHOLECALCIFEROL) 1000 UNIT TABS tablet Take 1,000 Units by mouth daily      aspirin 81 MG tablet Take 1 tablet by mouth daily LD 2/12/16 per surgeon (Patient taking differently: Take 81 mg by mouth daily ) 30 tablet 0    Multiple Vitamin TABS Take by mouth      Flaxseed, Linseed, (FLAX SEED OIL PO) Take 1,000 mg by mouth 2 times daily LD 2/15/16      vitamin E 1000 UNITS capsule Take 1,000 Units by mouth daily LD 2/15/16       No current facility-administered medications for this visit. No Known Allergies    Chief Complaint:  Lopez Peñaloza is here today for follow up and management/recomendations for CAD, ICM, hypercholesterolemia. History of Present Illness: Lopez Peñaloza states that He does house work, yard work, goes up the stairs & goes shopping. He also performs strenuous activities taking care of his 3 acres. [facility] he had to bring his dog out from a very steep incline that was approximately 50 yards. He denies any CP or dyspnea with any of these activities.   He recently did have problems with a cough/upper respiratory tract symptoms. He then had a panic attack where he felt more short of breath and chest discomfort. He took a Xanax and his symptoms completely resolved. He then was placed on steroids and an inhaler and his symptoms have completely resolved. He denies any orthopnea/PND. He denies any presyncopal symptoms. REVIEW OF SYSTEMS:  As above. Patient does not complain of any fever, chills, nausea, vomiting or diarrhea. No focal, motor or neurological deficits. No changes in his/her vision, hearing, bowel or bladder habits. He is not known to have a history of thyroid problems. No recent nose bleeds. PHYSICAL EXAM:  Vitals:    05/05/21 1057   BP: 120/78   Pulse: 63   Resp: 16   Weight: 200 lb (90.7 kg)   Height: 5' 8\" (1.727 m)       GENERAL:  He is alert and oriented x 3, communicates well, in no distress. NECK:  No masses, trachea is mid position. Supple, full ROM, no JVD or bruits. No palpable thyromegaly or lymphadenopathy. HEART: Regular rate and rhythm. Normal S1 and S2. There is an S4 gallop and a I/VI (Normal physiologic) systolic murmur. Distant auscultation. LUNGS:  Clear to auscultation bilaterally. No use of accessory muscles. symmetrical excursion. ABDOMEN: Soft, non-tender. Normal bowel sounds. Obese. EXTREMITIES:  Full ROM x 4. Trace bilateral lower extremity edema on exam.  Good distal pulses. EYES:  Extraocular muscles intact. PERRL. Normal lids & conjunctiva. ENT:  Nares are clear & not bleeding. Moist mucosa. Normal lips formation. No external masses   NEURO: no tremors, full ROM x 4, EOMI. SKIN:  Warm, dry and intact. Normal turgor. EKG: Sinus rhythm, 63 bpm, nl axis, nonspecific ST - T wave changes. Assessment:   1. Coronary artery disease as outlined above. No symptoms of ischemia at this time. 2. Recent abnormal but low risk stress test with a small area of reversibility in the inferior lateral wall.   No symptoms even at high workloads. 3. Cardiomyopathy as outlined above. No decompensation at this time. 4. Hypercholesterolemia      Recommendations:  1. He is following the cholesterol with Dr. Mark Argueta. 2. Continue his current cardiac medications. Thank you for allowing me to participate in your patient's care.       [address], [address] [facility]  Interventional Cardiology

## 2021-07-02 ENCOUNTER — HOSPITAL ENCOUNTER (OUTPATIENT)
Age: 76
Discharge: HOME | End: 2021-07-04
Payer: MEDICARE

## 2021-07-02 ENCOUNTER — HOSPITAL ENCOUNTER (OUTPATIENT)
Dept: CT IMAGING | Age: 76
Discharge: HOME | End: 2021-07-04
Payer: MEDICARE

## 2021-07-02 DIAGNOSIS — R10.84 ABDOMINAL PAIN, GENERALIZED: ICD-10-CM

## 2021-07-02 DIAGNOSIS — K57.32 CECAL DIVERTICULITIS: ICD-10-CM

## 2021-07-02 PROCEDURE — 74177 CT ABD & PELVIS W/CONTRAST: CPT

## 2021-07-02 PROCEDURE — 2580000003 HC RX 258: Performed by: RADIOLOGY

## 2021-07-02 PROCEDURE — 6360000004 HC RX CONTRAST MEDICATION: Performed by: RADIOLOGY

## 2021-07-02 RX ORDER — SODIUM CHLORIDE 0.9 % (FLUSH) 0.9 %
5-40 SYRINGE (ML) INJECTION 2 TIMES DAILY
Status: DISCONTINUED | OUTPATIENT
Start: 2021-07-02 | End: 2021-07-05 | Stop reason: HOSPADM

## 2021-07-02 RX ADMIN — SODIUM CHLORIDE, PRESERVATIVE FREE 10 ML: 5 INJECTION INTRAVENOUS at 10:57

## 2021-07-02 RX ADMIN — IOHEXOL 50 ML: 240 INJECTION, SOLUTION INTRATHECAL; INTRAVASCULAR; INTRAVENOUS; ORAL at 10:57

## 2021-07-02 RX ADMIN — IOPAMIDOL 100 ML: 755 INJECTION, SOLUTION INTRAVENOUS at 10:57

## 2021-09-15 ENCOUNTER — HOSPITAL ENCOUNTER (OUTPATIENT)
Dept: CT IMAGING | Age: 76
Discharge: HOME | End: 2021-09-17
Payer: MEDICARE

## 2021-09-15 DIAGNOSIS — K57.32 CECAL DIVERTICULITIS: ICD-10-CM

## 2021-09-15 PROCEDURE — 74177 CT ABD & PELVIS W/CONTRAST: CPT

## 2021-09-15 PROCEDURE — 6360000004 HC RX CONTRAST MEDICATION: Performed by: RADIOLOGY

## 2021-09-15 RX ADMIN — IOPAMIDOL 75 ML: 755 INJECTION, SOLUTION INTRAVENOUS at 11:48

## 2021-09-17 NOTE — PROGRESS NOTES

## 2021-09-17 NOTE — PROGRESS NOTES
[facility] PRE-ADMISSION TESTING INSTRUCTIONS      ARRIVAL INSTRUCTIONS:  [x] Parking the day of Surgery is located in the Main Entrance lot. Upon entering the main door make an immediate right to the surgery reception desk. [x] Bring photo ID and insurance card    [] Bring in a copy of Living will or Durable Power of  papers. [x] Please be sure to arrange for responsible adult to provide transportation to and from the hospital    [x] Please arrange for responsible adult to be with you for the 24 hour period post procedure due to having anesthesia      GENERAL INSTRUCTIONS:    [x] Nothing by mouth after midnight, including gum, candy, mints or water    [x] You may brush your teeth, but do not swallow any water    [x] Take medications as instructed with 1-2 oz of water    [x] Stop herbal supplements and vitamins 5 days prior to procedure    [x] Follow preop dosing of blood thinners per physician instructions    [] Take 1/2 dose of evening insulin, but no insulin after midnight    [] No oral diabetic medications after midnight    [] If diabetic and have low blood sugar or feel symptomatic, take 1-2oz apple juice only    [] Bring inhalers day of surgery    [] Bring C-PAP/ Bi-Pap day of surgery    [] Bring urine specimen day of surgery    [] Shower or bath with soap, lather and rinse well, AM of Surgery, no lotion, powders or creams to surgical site    [x] Follow bowel prep as instructed per surgeon    [] No tobacco products within 24 hours of surgery     [x] No alcohol or illegal drug use within 24 hours of surgery.     [x] Jewelry, body piercing's, eyeglasses, contact lenses and dentures are not permitted into surgery (bring cases)      [x] Please do not wear any nail polish, make up or hair products on the day of surgery    [x] You can expect a call the business day prior to procedure to notify you if your arrival time changes    [x] If you receive a survey after surgery we would greatly appreciate your comments    [x] Please notify surgeon if you develop any illness between now and time of surgery (cold, cough, sore throat, fever, nausea, vomiting) or any signs of infections  including skin, wounds, and dental.    []  The Outpatient Pharmacy is available to fill your prescription here on your day of surgery, ask your preop nurse for details

## 2021-09-22 ENCOUNTER — HOSPITAL ENCOUNTER (OUTPATIENT)
Age: 76
Setting detail: OUTPATIENT SURGERY
Discharge: HOME | End: 2021-09-22
Attending: SURGERY | Admitting: SURGERY
Payer: MEDICARE

## 2021-09-22 ENCOUNTER — ANESTHESIA (OUTPATIENT)
Dept: ENDOSCOPY | Age: 76
End: 2021-09-22
Payer: MEDICARE

## 2021-09-22 ENCOUNTER — ANESTHESIA EVENT (OUTPATIENT)
Dept: ENDOSCOPY | Age: 76
End: 2021-09-22
Payer: MEDICARE

## 2021-09-22 VITALS
HEIGHT: 68 IN | WEIGHT: 190 LBS | OXYGEN SATURATION: 95 % | RESPIRATION RATE: 19 BRPM | HEART RATE: 74 BPM | DIASTOLIC BLOOD PRESSURE: 61 MMHG | SYSTOLIC BLOOD PRESSURE: 122 MMHG | TEMPERATURE: 97.8 F | BODY MASS INDEX: 28.79 KG/M2

## 2021-09-22 VITALS — OXYGEN SATURATION: 94 % | SYSTOLIC BLOOD PRESSURE: 142 MMHG | DIASTOLIC BLOOD PRESSURE: 80 MMHG

## 2021-09-22 PROCEDURE — 6360000002 HC RX W HCPCS: Performed by: NURSE ANESTHETIST, CERTIFIED REGISTERED

## 2021-09-22 PROCEDURE — 7100000011 HC PHASE II RECOVERY - ADDTL 15 MIN: Performed by: SURGERY

## 2021-09-22 PROCEDURE — 2709999900 HC NON-CHARGEABLE SUPPLY: Performed by: SURGERY

## 2021-09-22 PROCEDURE — 3700000000 HC ANESTHESIA ATTENDED CARE: Performed by: SURGERY

## 2021-09-22 PROCEDURE — 3700000001 HC ADD 15 MINUTES (ANESTHESIA): Performed by: SURGERY

## 2021-09-22 PROCEDURE — 7100000010 HC PHASE II RECOVERY - FIRST 15 MIN: Performed by: SURGERY

## 2021-09-22 PROCEDURE — 2580000003 HC RX 258: Performed by: SURGERY

## 2021-09-22 PROCEDURE — 3609027000 HC COLONOSCOPY: Performed by: SURGERY

## 2021-09-22 RX ORDER — SODIUM CHLORIDE 9 MG/ML
INJECTION, SOLUTION INTRAVENOUS CONTINUOUS
Status: DISCONTINUED | OUTPATIENT
Start: 2021-09-22 | End: 2021-09-22 | Stop reason: HOSPADM

## 2021-09-22 RX ORDER — PROPOFOL 10 MG/ML
INJECTION, EMULSION INTRAVENOUS PRN
Status: DISCONTINUED | OUTPATIENT
Start: 2021-09-22 | End: 2021-09-22 | Stop reason: SDUPTHER

## 2021-09-22 RX ORDER — FENTANYL CITRATE 50 UG/ML
INJECTION, SOLUTION INTRAMUSCULAR; INTRAVENOUS PRN
Status: DISCONTINUED | OUTPATIENT
Start: 2021-09-22 | End: 2021-09-22 | Stop reason: SDUPTHER

## 2021-09-22 RX ADMIN — PROPOFOL 180 MG: 10 INJECTION, EMULSION INTRAVENOUS at 07:57

## 2021-09-22 RX ADMIN — SODIUM CHLORIDE: 9 INJECTION, SOLUTION INTRAVENOUS at 07:56

## 2021-09-22 RX ADMIN — FENTANYL CITRATE 50 MCG: 50 INJECTION, SOLUTION INTRAMUSCULAR; INTRAVENOUS at 07:57

## 2021-09-22 RX ADMIN — FENTANYL CITRATE 50 MCG: 50 INJECTION, SOLUTION INTRAMUSCULAR; INTRAVENOUS at 08:03

## 2021-09-22 ASSESSMENT — PAIN - FUNCTIONAL ASSESSMENT: PAIN_FUNCTIONAL_ASSESSMENT: 0-10

## 2021-09-22 NOTE — ANESTHESIA PRE PROCEDURE
Department of Anesthesiology  Preprocedure Note       Name:  Sandra Villa   Age:  70 y.o.  :  1945                                          MRN:  [id number]         Date:  2021      Surgeon: Wilda Young):  Trish Villalpando MD    Procedure: Procedure(s):  COLONOSCOPY DIAGNOSTIC    Medications prior to admission:   Prior to Admission medications    Medication Sig Start Date End Date Taking? Authorizing Provider   nitroGLYCERIN (NITROSTAT) 0.4 MG SL tablet Place 1 tablet under the tongue every 5 minutes as needed (CP) 21  Yes George Judge DO   atorvastatin (LIPITOR) 20 MG tablet 20 mg daily  2/10/21  Yes Historical Provider, MD   fluticasone (FLONASE) 50 MCG/ACT nasal spray as needed  3/1/21  Yes Historical Provider, MD   carvedilol (COREG) 3.125 MG tablet TAKE 1 TABLET TWICE DAILY WITH MEALS 20  Yes George Judge DO   clopidogrel (PLAVIX) 75 MG tablet TAKE 1 TABLET EVERY DAY 20  Yes George Judge DO   vitamin C (ASCORBIC ACID) 500 MG tablet Take 500 mg by mouth daily   Yes Historical Provider, MD   Lactobacillus (PROBIOTIC ACIDOPHILUS PO) Take by mouth   Yes Historical Provider, MD   vitamin D (CHOLECALCIFEROL) 1000 UNIT TABS tablet Take 1,000 Units by mouth daily   Yes Historical Provider, MD   aspirin 81 MG tablet Take 1 tablet by mouth daily LD 16 per surgeon  Patient taking differently: Take 81 mg by mouth daily  16  Yes Saray Ahn MD   Multiple Vitamin TABS Take by mouth 10/7/05  Yes Historical Provider, MD   Flaxseed, Linseed, (FLAX SEED OIL PO) Take 1,000 mg by mouth 2 times daily LD 2/15/16   Yes Historical Provider, MD       Current medications:    No current facility-administered medications for this encounter.        Allergies:  No Known Allergies    Problem List:    Patient Active Problem List   Diagnosis Code    CAD (coronary artery disease) I25.10    Ischemic cardiomyopathy I25.5    Mixed hyperlipidemia E78.2       Past Medical History: Diagnosis Date    CAD (coronary artery disease)     follows with Dr Maria Johansen [phone]   [facility] retinal vein occlusion of right eye     Right  eye    Colon cancer screening     9/22/21    Hyperlipidemia     Hypertension     Hypokinesis     Lateral, but preserved LV systolic function    Myocardial infarction ([address]     Non-Q wave inferoposterior (6/7/08)       Past Surgical History:        Procedure Laterality Date    CARDIAC CATHETERIZATION  6/5/2013    Dr Nehemiah Bello  2/18/16    CORONARY ANGIOPLASTY WITH STENT PLACEMENT  11/12/2-12    2.5x14 Resolute stent placed in distal RCA by DR [email or web address] [facility] GRAFT  6/7/08    Dr. [place] CATH LAB PROCEDURE  6/7/08    KNEE SURGERY      TONSILLECTOMY         Social History:    Social History     Tobacco Use    Smoking status: Never Smoker    Smokeless tobacco: Never Used   Substance Use Topics    Alcohol use: Yes     Alcohol/week: 1.0 standard drinks     Types: 1 Cans of beer per week     Comment: socially                                Counseling given: Not Answered      Vital Signs (Current):   Vitals:    09/17/21 1128   Weight: 190 lb (86.2 kg)   Height: 5' 8\" (1.727 m)                                              BP Readings from Last 3 Encounters:   05/05/21 120/78   03/04/21 112/60   03/03/21 110/68       NPO Status:                                                                                 BMI:   Wt Readings from Last 3 Encounters:   09/17/21 190 lb (86.2 kg)   05/05/21 200 lb (90.7 kg)   03/04/21 195 lb (88.5 kg)     Body mass index is 28.89 kg/m².     CBC:   Lab Results   Component Value Date    WBC 8.1 06/04/2013    RBC 5.17 06/04/2013    HGB 15.6 06/04/2013    HCT 47.0 06/04/2013    MCV 90.9 06/04/2013    RDW 13.2 06/04/2013     06/04/2013       CMP:   Lab Results   Component Value Date     06/04/2013    K 4.4 06/04/2013     06/04/2013    CO2 26 06/04/2013    BUN 18 06/04/2013    CREATININE 1.2 06/04/2013    LABGLOM >60 06/04/2013    GLUCOSE 124 06/04/2013    GLUCOSE 100 12/16/2011    PROT 7.0 12/16/2011    CALCIUM 9.5 06/04/2013    BILITOT 0.8 12/16/2011    ALKPHOS 94 12/16/2011    AST 26 03/23/2012    ALT 28 03/23/2012       POC Tests: No results for input(s): POCGLU, POCNA, POCK, POCCL, POCBUN, POCHEMO, POCHCT in the last 72 hours. Coags:   Lab Results   Component Value Date    PROTIME 12.3  11/10/2012    INR 1.2 11/10/2012    APTT 30.3 11/10/2012       HCG (If Applicable): No results found for: PREGTESTUR, PREGSERUM, HCG, HCGQUANT     ABGs: No results found for: PHART, PO2ART, MDJ9BYN, DPQ1OMG, BEART, B7SKVBCO     Type & Screen (If Applicable):  No results found for: LABABO, LABRH    Drug/Infectious Status (If Applicable):  No results found for: HIV, HEPCAB    COVID-19 Screening (If Applicable): No results found for: COVID19        Anesthesia Evaluation  Patient summary reviewed no history of anesthetic complications:   Airway: Mallampati: II  TM distance: >3 FB   Neck ROM: full   Dental: normal exam         Pulmonary:Negative Pulmonary ROS breath sounds clear to auscultation                             Cardiovascular:    (+) hypertension:, past MI: > 6 months, CAD: obstructive, CABG/stent:, hyperlipidemia        Rhythm: regular  Rate: normal           Beta Blocker:  Dose within 24 Hrs         Neuro/Psych:   Negative Neuro/Psych ROS               ROS comment: Central retinal vein occlusion of right eye GI/Hepatic/Renal:   (+) bowel prep,           Endo/Other: Negative Endo/Other ROS                    Abdominal:             Vascular: negative vascular ROS. Other Findings:           Anesthesia Plan      MAC     ASA 3       Induction: intravenous. MIPS: Postoperative opioids intended and Prophylactic antiemetics administered. Anesthetic plan and risks discussed with patient. Plan discussed with CRNA.                   Christa Sheets MD 9/22/2021

## 2021-09-22 NOTE — OP NOTE
COLONOSCOPY PROCEDURE NOTE    DATE OF PROCEDURE: 9/22/2021    PREOPERATIVE DIAGNOSIS:  Hx of diverticulitis     POSTOPERATIVE DIAGNOSIS/FINDINGS:  Pan diverticulosis, some inflammation at sigmoid colon, repeat in 10 years      SURGEON: Morenita Banuelos MD    ASSISTANT: None    OPERATION: Total Colonoscopy     ANESTHESIA: Local monitored anesthesia. CONDITION: Stable    COMPLICATIONS: None. Specimens Removed: as above    EBL: None      BRIEF HISTORY:  This is a 70 y.o. male who presents with the complaint of diverticulitis hx. It was recommended the patient undergo a colonoscopy. The risks/benefits/alternatives/expected outcomes were explained the the patient. The patient verbalized understanding and agreed to proceed. PROCEDURE:  The patient was brought into the endoscopy suite and placed in the left lateral decubitus position. A digital rectal exam was performed after the initiation of LMAC anesthesia and failed to reveal any obstructing masses or lesions. A colonoscope was inserted into the patient's anus and passed through the rectum, sigmoid, descending, transverse, and ascending colon all the way to the level of the cecum. Visualization of the cecum was confirmed by visualization of the ileo-cecal valve, confluence of the tinea, and by visualization of the light in the RLQ on the anterior abdominal wall. The scope was then withdrawn the entire length of the colon. There were no masses, polyps, or lesions noted. Pan diverticulosis. Upon reaching the anus, the scope was retroflexed. There were no significant hemorrhoids noted. The scope was straightened and withdrawn entirely. The patient tolerated the procedure well and there were no complications.         Morenita Banuelos MD, MD  9/22/2021

## 2021-09-22 NOTE — H&P
Patient was referred by Toma Barrera D.O..  Patient's primary care provider is . CC: Patient presents for colon cancer screening.     HPI: Colorectal cancer screening, colon polyps and diverticulitis, but denies appetite change, weight loss, colitis, colon cancer, constipation, diarrhea, diverticulosis, family history of colon cancer, hemorrhoids and rectal bleeding. All   End All       Meds Prior to Visit:  Albuterol Sulfate  (2.5 mg/3ml) 0.083%   Clopidogrel Bisulfate  300 mg   Carvedilol  3.125 mg   Nitrostat  0.4 mg   Lorazepam  0.5 mg  1 by mouth as needed  Aspirin Adult Low Dose  81 mg  every day  Atorvastatin Calcium  20 mg  every day      Allergies:  NKDA     PMH:  Problem List: Diverticulitis of colon  Medical Problems:  Heart, Diabetes, Hyperlipidemia, Hypertension, Anxiety  Surgical Hx:  Coronary Bypass - (2008) DR. Izzy Bernstein  RT Knee - (2005) [facility] DR. YANES  LT Knee - (2015) DR. YANES  Reviewed, no changes. FH:  Reviewed, no changes. SH:  Personal Habits:  Tobacco Use: Patient has never smoked. Alcohol: Current Alcohol Use; Social.Drug Use: Denies Drug Use. Daily Caffeine: Uses Caffeine. Reviewed, no changes. Date: 08/10/2021  Was the patient queried about smoking behavior? Yes  No  Does the patient currently smoke? Tobacco Use: Patient has never smoked.     ROS:  Const: Reports anxiety, but denies anorexia, fatigue, night sweats, weight gain and weight loss. Eyes: Denies eye symptoms. ENMT: Denies ear symptoms. Denies nasal symptoms. Denies mouth or throat symptoms. CV: Reports heart problems and hypertension, but denies other cardiovascular symptoms. Resp: Denies respiratory symptoms. GI: Denies hepatitis, liver disease and other gastrointestinal symptoms. Musculo: Denies musculoskeletal symptoms. Skin: Denies skin, hair and nail symptoms. Breast: Denies breast problems. Neuro: Denies neurologic symptoms. Psych: Denies depression and substance abuse.   Endocrine: Denies diabetes, kidney disease and thyroid disease. Hema/Lymph: Denies anemia, blood disease, cancer and past transfusion. Allergy/Immuno: Denies immunosuppression. Reviewed, no changes.           Exam:  Const: Appears healthy and well developed. No signs of apparent distress present. Head/Face: Atraumatic, normocephalic on inspection. Eyes: Conjunctivae pink. Pupils equal, round and reactive to light. Sclerae clear and anicteric. ENMT: External ears WNL. External nose WNL. Lips: Appear normal and healthy. Neck: Normal to inspection. Normal to palpation. No masses appreciated. Trachea midline. Thyroid is normal in size. No jugular venous distention. Resp: Respiration rate is normal. No wheezing. Clear to auscultation bilaterally. No rales or rhonchi appreciated over the lungs bilaterally. CV: Rate is regular. Rhythm is regular. S1 is normal. S2 is normal. No heart murmur appreciated. Extremities: No clubbing or cyanosis. No edema of the lower limbs bilaterally. Abdomen: No visible herniations. No abdominal scars. Positive bowel sounds in all quadrants. Abdomen is soft, nontender, and nondistended without guarding, rigidity or rebound tenderness. No palpable abdominal aneurysms present. No palpable hepatosplenomegaly. Lymph: No palpable lymphadenopathy in the cervical, supraclavicular, axillary and inguinal region(s). Musculo: Walks with a normal gait. Upper Extremities: Normal to inspection. ROM: Full ROM bilaterally. Lower Extremities: Normal to inspection. ROM: Full ROM bilaterally. Skin: Skin warm and dry with no evidence of unusual rashes or suspicious lesions. Neuro: Alert and oriented x3. Mood is normal.  Cranial Nerves: Cranial nerves II-XII grossly intact. Psych: Cognition: Judgment and insight are grossly intact.                     Assessment #1: Hx K57.32 Diverticulitis of large intestine without perforation or abscess without bleeding   Care Plan:              Comments       :  treated last month for diverticulitis  colonoscopy     Risks, benefits, alternatives, complications all discussed  BE and perforation   Decision-making was moderate in complexity  Approximately 30 minutes spent reviewing information, data provided/obtained prior to office visit, obtaining information (history and physical) during office visit, in order to coordinate care regarding goals of care, diagnosis and prognosis

## 2021-12-06 RX ORDER — CARVEDILOL 3.12 MG/1
TABLET ORAL
Qty: 180 TABLET | Refills: 3 | Status: SHIPPED | OUTPATIENT
Start: 2021-12-06

## 2021-12-06 RX ORDER — CLOPIDOGREL BISULFATE 75 MG/1
TABLET ORAL
Qty: 90 TABLET | Refills: 3 | Status: SHIPPED | OUTPATIENT
Start: 2021-12-06

## 2022-01-25 ENCOUNTER — OFFICE VISIT (OUTPATIENT)
Dept: CARDIOLOGY CLINIC | Age: 77
End: 2022-01-25
Payer: MEDICARE

## 2022-01-25 VITALS
DIASTOLIC BLOOD PRESSURE: 80 MMHG | BODY MASS INDEX: 29.7 KG/M2 | RESPIRATION RATE: 18 BRPM | HEIGHT: 68 IN | SYSTOLIC BLOOD PRESSURE: 116 MMHG | HEART RATE: 65 BPM | WEIGHT: 196 LBS

## 2022-01-25 DIAGNOSIS — I25.5 ISCHEMIC CARDIOMYOPATHY: ICD-10-CM

## 2022-01-25 DIAGNOSIS — E78.2 MIXED HYPERLIPIDEMIA: ICD-10-CM

## 2022-01-25 DIAGNOSIS — I25.119 CORONARY ARTERY DISEASE WITH ANGINA PECTORIS, UNSPECIFIED VESSEL OR LESION TYPE, UNSPECIFIED WHETHER NATIVE OR TRANSPLANTED HEART (HCC): Primary | ICD-10-CM

## 2022-01-25 PROCEDURE — 4040F PNEUMOC VAC/ADMIN/RCVD: CPT | Performed by: INTERNAL MEDICINE

## 2022-01-25 PROCEDURE — 1036F TOBACCO NON-USER: CPT | Performed by: INTERNAL MEDICINE

## 2022-01-25 PROCEDURE — G8484 FLU IMMUNIZE NO ADMIN: HCPCS | Performed by: INTERNAL MEDICINE

## 2022-01-25 PROCEDURE — G8427 DOCREV CUR MEDS BY ELIG CLIN: HCPCS | Performed by: INTERNAL MEDICINE

## 2022-01-25 PROCEDURE — 3017F COLORECTAL CA SCREEN DOC REV: CPT | Performed by: INTERNAL MEDICINE

## 2022-01-25 PROCEDURE — 99213 OFFICE O/P EST LOW 20 MIN: CPT | Performed by: INTERNAL MEDICINE

## 2022-01-25 PROCEDURE — 1123F ACP DISCUSS/DSCN MKR DOCD: CPT | Performed by: INTERNAL MEDICINE

## 2022-01-25 PROCEDURE — 93000 ELECTROCARDIOGRAM COMPLETE: CPT | Performed by: INTERNAL MEDICINE

## 2022-01-25 PROCEDURE — G8417 CALC BMI ABV UP PARAM F/U: HCPCS | Performed by: INTERNAL MEDICINE

## 2022-01-25 RX ORDER — NITROGLYCERIN 0.4 MG/1
0.4 TABLET SUBLINGUAL EVERY 5 MIN PRN
Qty: 25 TABLET | Refills: 3 | Status: SHIPPED
Start: 2022-01-25 | End: 2022-11-02 | Stop reason: SDUPTHER

## 2022-01-25 NOTE — PROGRESS NOTES
Godwin Meraz [facility]  1945  Date of Service: 1/25/2022    Patient Active Problem List    Diagnosis Date Noted    Mixed hyperlipidemia 06/04/2013    CAD (coronary artery disease)      Overview Note:     A. Non Q-wave inferoposterior myocardial infarction (6/7/08). B. Cardiac catheterization (6/7/08). LAD proximal 80%, distal 99%. D1 1.8 mm vessel ostial 99%. Circumflex distal 80%. OM 3 ostial 100%. RCA distal 99% at the bifurcation of PDA posterior lateral branch. C. Urgent coronary artery bypass surgery by Dr. Jacquelin Mccoy (6/7/08). LIMA to the LAD. Heart cath (11-12-12)  LIMA - LAD patent  SVG - OM2: patent  SVG - PLB: occluded  SVG - PDA: occluded  Apical LAD: 85 & 95% lesions (1.5 mm vessel)    2.5x14 Resolute MARQUES distal RCA  SVG to the OM3. SVG to the PDA. SVG to the posterior lateral branch. 6-13 cath stent patent      Ischemic cardiomyopathy      Overview Note:     Lateral, but preserved LV systolic function         Social History     Socioeconomic History    Marital status:      Spouse name: None    Number of children: None    Years of education: None    Highest education level: None   Occupational History    None   Tobacco Use    Smoking status: Never Smoker    Smokeless tobacco: Never Used   Vaping Use    Vaping Use: Never used   Substance and Sexual Activity    Alcohol use: Yes     Alcohol/week: 1.0 standard drink     Types: 1 Cans of beer per week     Comment: socially    Drug use: No    Sexual activity: None   Other Topics Concern    None   Social History Narrative    None     Social Determinants of Health     Financial Resource Strain:     Difficulty of Paying Living Expenses: Not on file   Food Insecurity:     Worried About Running Out of Food in the Last Year: Not on file    Jose of Food in the Last Year: Not on file   Transportation Needs:     Lack of Transportation (Medical): Not on file    Lack of Transportation (Non-Medical):  Not on file Physical Activity:     Days of Exercise per Week: Not on file    Minutes of Exercise per Session: Not on file   Stress:     Feeling of Stress : Not on file   Social Connections:     Frequency of Communication with Friends and Family: Not on file    Frequency of Social Gatherings with Friends and Family: Not on file    Attends [employer] Services: Not on file    Active Member of [address] [place] [address] or Organizations: Not on file    Attends Club or Organization Meetings: Not on file    Marital Status: Not on file   Intimate Partner Violence:     Fear of Current or Ex-Partner: Not on file    Emotionally Abused: Not on file    Physically Abused: Not on file    Sexually Abused: Not on file   Housing Stability:     Unable to Pay for Housing in the Last Year: Not on file    Number of [place] in the Last Year: Not on file    Unstable Housing in the Last Year: Not on file       Current Outpatient Medications   Medication Sig Dispense Refill    carvedilol (COREG) 3.125 MG tablet TAKE 1 TABLET TWICE DAILY WITH MEALS 180 tablet 3    clopidogrel (PLAVIX) 75 MG tablet TAKE 1 TABLET EVERY DAY 90 tablet 3    nitroGLYCERIN (NITROSTAT) 0.4 MG SL tablet Place 1 tablet under the tongue every 5 minutes as needed (CP) 25 tablet 3    atorvastatin (LIPITOR) 20 MG tablet 20 mg daily       fluticasone (FLONASE) 50 MCG/ACT nasal spray as needed       vitamin C (ASCORBIC ACID) 500 MG tablet Take 500 mg by mouth daily      Lactobacillus (PROBIOTIC ACIDOPHILUS PO) Take by mouth      vitamin D (CHOLECALCIFEROL) 1000 UNIT TABS tablet Take 1,000 Units by mouth daily      aspirin 81 MG tablet Take 1 tablet by mouth daily LD 2/12/16 per surgeon (Patient taking differently: Take 81 mg by mouth daily ) 30 tablet 0    Multiple Vitamin TABS Take by mouth      Flaxseed, Linseed, (FLAX SEED OIL PO) Take 1,000 mg by mouth 2 times daily LD 2/15/16       No current facility-administered medications for this visit.         No Known Allergies    Chief Complaint:  Aníbal Grijalva is here today for follow up and management/recomendations for CAD, ICM, hypercholesterolemia. History of Present Illness: Aníbal Grijalva states that He does house work, yard work, goes up the stairs & goes shopping. He also performs strenuous activities taking care of his 3 acres. He recently shoveled his driveway and frequently goes for walks in the woods. He denies any CP or dyspnea with any of these activities. He denies any orthopnea/PND. He denies any presyncopal symptoms. REVIEW OF SYSTEMS:  As above. Patient does not complain of any fever, chills, nausea, vomiting or diarrhea. No focal, motor or neurological deficits. No changes in his/her vision, hearing, bowel or bladder habits. He is not known to have a history of thyroid problems. No recent nose bleeds. PHYSICAL EXAM:  Vitals:    01/25/22 0818   BP: 116/80   Pulse: 65   Resp: 18   Weight: 196 lb (88.9 kg)   Height: 5' 8\" (1.727 m)       GENERAL:  He is alert and oriented x 3, communicates well, in no distress. NECK:  No masses, trachea is mid position. Supple, full ROM, no JVD or bruits. No palpable thyromegaly or lymphadenopathy. HEART: Regular rate and rhythm. Normal S1 and S2. There is an S4 gallop and a I/VI (Normal physiologic) systolic murmur. Distant auscultation. LUNGS:  Clear to auscultation bilaterally. No use of accessory muscles. symmetrical excursion. ABDOMEN: Soft, non-tender. Normal bowel sounds. Obese. EXTREMITIES:  Full ROM x 4. Trace bilateral lower extremity edema on exam.  Good distal pulses. EYES:  Extraocular muscles intact. PERRL. Normal lids & conjunctiva. ENT:  Nares are clear & not bleeding. Moist mucosa. Normal lips formation. No external masses   NEURO: no tremors, full ROM x 4, EOMI. SKIN:  Warm, dry and intact. Normal turgor. EKG: Sinus rhythm, 63 bpm, nl axis, nonspecific ST - T wave changes. Assessment:   1.  Coronary artery disease as outlined above. No ischemic symptoms at this time. 2. Cardiomyopathy as outlined above. No decompensation at this time. 3. Hypercholesterolemia  4. He was recently diagnosed with ulcerative colitis. Recommendations:  1. He is following the cholesterol with Dr. Adrian Diop. 2. Continue his current cardiac medications at this time. Thank you for allowing me to participate in your patient's care.       [address], [address] [facility]  Interventional Cardiology

## 2022-06-22 ENCOUNTER — TELEPHONE (OUTPATIENT)
Dept: CARDIOLOGY CLINIC | Age: 77
End: 2022-06-22

## 2022-06-22 NOTE — TELEPHONE ENCOUNTER
Patient scheduled for colonoscopy on 8/4/22. Recommended to hold Plavix (5) days prior. Please advise.

## 2022-06-22 NOTE — TELEPHONE ENCOUNTER
Change the Plavix to enteric-coated aspirin 81 mg daily. Do not hold the aspirin for the colonoscopy. Switch back to Plavix after the colonoscopy when that physician feels that it is okay.

## 2022-11-02 ENCOUNTER — OFFICE VISIT (OUTPATIENT)
Dept: CARDIOLOGY CLINIC | Age: 77
End: 2022-11-02
Payer: MEDICARE

## 2022-11-02 VITALS
WEIGHT: 190 LBS | BODY MASS INDEX: 28.79 KG/M2 | HEART RATE: 55 BPM | DIASTOLIC BLOOD PRESSURE: 78 MMHG | RESPIRATION RATE: 16 BRPM | SYSTOLIC BLOOD PRESSURE: 126 MMHG | HEIGHT: 68 IN

## 2022-11-02 DIAGNOSIS — I25.119 CORONARY ARTERY DISEASE WITH ANGINA PECTORIS, UNSPECIFIED VESSEL OR LESION TYPE, UNSPECIFIED WHETHER NATIVE OR TRANSPLANTED HEART (HCC): Primary | ICD-10-CM

## 2022-11-02 PROBLEM — S46.219A RUPTURE OF DISTAL BICEPS TENDON: Status: ACTIVE | Noted: 2017-01-13

## 2022-11-02 PROBLEM — E43 SEVERE PROTEIN-CALORIE MALNUTRITION (HCC): Status: ACTIVE | Noted: 2021-11-04

## 2022-11-02 PROBLEM — K51.00 ULCERATIVE (CHRONIC) ENTEROCOLITIS (HCC): Status: ACTIVE | Noted: 2022-01-04

## 2022-11-02 PROCEDURE — G8427 DOCREV CUR MEDS BY ELIG CLIN: HCPCS | Performed by: INTERNAL MEDICINE

## 2022-11-02 PROCEDURE — 93000 ELECTROCARDIOGRAM COMPLETE: CPT | Performed by: INTERNAL MEDICINE

## 2022-11-02 PROCEDURE — 1123F ACP DISCUSS/DSCN MKR DOCD: CPT | Performed by: INTERNAL MEDICINE

## 2022-11-02 PROCEDURE — 1036F TOBACCO NON-USER: CPT | Performed by: INTERNAL MEDICINE

## 2022-11-02 PROCEDURE — 3017F COLORECTAL CA SCREEN DOC REV: CPT | Performed by: INTERNAL MEDICINE

## 2022-11-02 PROCEDURE — G8417 CALC BMI ABV UP PARAM F/U: HCPCS | Performed by: INTERNAL MEDICINE

## 2022-11-02 PROCEDURE — 3078F DIAST BP <80 MM HG: CPT | Performed by: INTERNAL MEDICINE

## 2022-11-02 PROCEDURE — 3074F SYST BP LT 130 MM HG: CPT | Performed by: INTERNAL MEDICINE

## 2022-11-02 PROCEDURE — 99213 OFFICE O/P EST LOW 20 MIN: CPT | Performed by: INTERNAL MEDICINE

## 2022-11-02 PROCEDURE — G8484 FLU IMMUNIZE NO ADMIN: HCPCS | Performed by: INTERNAL MEDICINE

## 2022-11-02 RX ORDER — NITROGLYCERIN 0.4 MG/1
0.4 TABLET SUBLINGUAL EVERY 5 MIN PRN
Qty: 25 TABLET | Refills: 3 | Status: SHIPPED | OUTPATIENT
Start: 2022-11-02

## 2022-11-02 NOTE — PROGRESS NOTES
Celia [facility]  1945  Date of Service: 11/2/2022    Patient Active Problem List    Diagnosis Date Noted    Ulcerative (chronic) enterocolitis ([facility] [address] 01/04/2022     Priority: Medium    Severe protein-calorie malnutrition ([facility] [address] 11/04/2021     Priority: Medium    Rupture of distal biceps tendon 01/13/2017     Priority: Medium    Fracture of multiple ribs of right side with routine healing 12/09/2016     Priority: Medium    Contusion of right forearm 12/08/2016     Priority: Medium    Weakness 12/08/2016     Priority: Medium    Difficulty walking 11/21/2016     Priority: Medium    Essential hypertension 10/13/2016     Priority: Medium    Knee pain 10/13/2016     Priority: Medium    S/P CABG x 4 10/13/2016     Priority: Medium    S/P coronary artery stent placement 10/13/2016     Priority: Medium    History of artificial joint 11/23/2010     Priority: Medium    Osteoarthritis of knee 11/12/2009     Priority: Medium    Mixed hyperlipidemia 06/04/2013    CAD (coronary artery disease)      Overview Note:     Non Q-wave inferoposterior myocardial infarction (6/7/08). Cardiac catheterization (6/7/08). LAD proximal 80%, distal 99%. D1 1.8 mm vessel ostial 99%. Circumflex distal 80%. OM 3 ostial 100%. RCA distal 99% at the bifurcation of PDA posterior lateral branch. Urgent coronary artery bypass surgery by Dr. Karlene Troy (6/7/08). LIMA to the LAD. Heart cath (11-12-12)  LIMA - LAD patent  SVG - OM2: patent  SVG - PLB: occluded  SVG - PDA: occluded  Apical LAD: 85 & 95% lesions (1.5 mm vessel)    2.5x14 Resolute MARQUES distal RCA  SVG to the OM3. SVG to the PDA. SVG to the posterior lateral branch.     6-13 cath stent patent      Ischemic cardiomyopathy      Overview Note:     Lateral, but preserved LV systolic function         Social History     Socioeconomic History    Marital status:      Spouse name: None    Number of children: None    Years of education: None    Highest education level: None Tobacco Use    Smoking status: Never    Smokeless tobacco: Never   Vaping Use    Vaping Use: Never used   Substance and Sexual Activity    Alcohol use: Yes     Alcohol/week: 1.0 standard drink     Types: 1 Cans of beer per week     Comment: socially    Drug use: No       Current Outpatient Medications   Medication Sig Dispense Refill    nitroGLYCERIN (NITROSTAT) 0.4 MG SL tablet Place 1 tablet under the tongue every 5 minutes as needed (CP) 25 tablet 3    carvedilol (COREG) 3.125 MG tablet TAKE 1 TABLET TWICE DAILY WITH MEALS 180 tablet 3    clopidogrel (PLAVIX) 75 MG tablet TAKE 1 TABLET EVERY DAY 90 tablet 3    atorvastatin (LIPITOR) 20 MG tablet 20 mg daily       fluticasone (FLONASE) 50 MCG/ACT nasal spray as needed       vitamin C (ASCORBIC ACID) 500 MG tablet Take 500 mg by mouth daily      vitamin D (CHOLECALCIFEROL) 1000 UNIT TABS tablet Take 1,000 Units by mouth daily      Multiple Vitamin TABS Take by mouth      Flaxseed, Linseed, (FLAX SEED OIL PO) Take 1,000 mg by mouth 2 times daily LD 2/15/16      Lactobacillus (PROBIOTIC ACIDOPHILUS PO) Take by mouth (Patient not taking: Reported on 11/2/2022)       No current facility-administered medications for this visit. No Known Allergies    Chief Complaint:  Nakia Dow is here today for follow up and management/recomendations for CAD, ICM, hypercholesterolemia. History of Present Illness: Nakia Dow states that He does house work, yard work, goes up the stairs & goes shopping. He also performs strenuous activities taking care of his 3 acres and goes for walks in the woods. He tires more easily than he used to but he denies any CP or dyspnea with any of these activities. He denies any orthopnea/PND. He denies any presyncopal symptoms. REVIEW OF SYSTEMS:  As above. Patient does not complain of any fever, chills, nausea, vomiting or diarrhea. No focal, motor or neurological deficits.  No changes in his/her vision, hearing, bowel or

## 2022-11-14 RX ORDER — CARVEDILOL 3.12 MG/1
TABLET ORAL
Qty: 180 TABLET | Refills: 3 | Status: SHIPPED | OUTPATIENT
Start: 2022-11-14

## 2022-11-14 RX ORDER — CLOPIDOGREL BISULFATE 75 MG/1
TABLET ORAL
Qty: 90 TABLET | Refills: 3 | Status: SHIPPED | OUTPATIENT
Start: 2022-11-14

## 2023-04-05 NOTE — TELEPHONE ENCOUNTER
PROGRESS NOTE    Subjective   Chief complaint: Gwen Fowler is a 89 y.o. female who is a acute skilled care patient being seen and evaluated for weakness.    HPI:  3/22/23 patient admitted to SNF for therapy d/t weakness after recent hospitalization s/p fall.  During hospitalization patient had nausea vomiting and abdominal pain.  HIDA scan was performed and showed chronic cholecystitis and patient underwent laparoscopic cholecystectomy.  She had some left knee pain and underwent aspiration to rule out septic arthritis and recommendation was made for further work-up as outpatient.  She was given a left lower extremity immobilizer.  She states that she continues to have pain in multiple joints which is not new.  Pain medication is somewhat effective.  Patient has been working in therapy and is working on transfers.  She requires maximum assist for stand pivot transfer and maximum assist for sit to stand at front wheeled walker.    3/23/23 patient continues to work in therapy and is working on safe transfers.  She requires maximum assist for transfers maximum assist for sit to stand at front wheeled walker.  No new concerns or complaints.  No acute distress.    3/24/2023 patient continues working in therapy.  She requires moderate assist for sit to stand transfers.  She is working in speech therapy as well.  Nurse reporting patient with nausea and vomiting after meals.  She was followed by GI in the hospital and treated with IV Protonix and IV Zofran.  Patient is currently on PPI.  She is having bowel movements according to the nurse.    3/27/23 Patient with Dementia continues to work in speech, physical and occupational therapy. She requires moderate assistance for moderate assistance for transfers. Nurse reports that she has had weight loss and dietician will evaluate.  Presently denies nausea or vomiting, fever, chills or diarrhea. In addition her BS was low at 42, she takes metformin once daily for DM. BMP was  Patient called with complaints of chest heaviness off and on at rest.  He states he also gets SOB out in the cold, but denies any SOB with the chest heaviness episodes. Discussed stress test at last visit but only echo was done at that time. Please advise. obtained and revealed H&H 7.4 and 12.5. No other concerns today.     3/29/2023 patient is working on transfers in therapy and continues to work toward goals.  She has no new concerns today.  Denies constitutional symptoms.    3/30/23 Patient working in therapy due to weakness and debility. Patient requires moderate assistance for transfers. Denies pain at this time. No acute distress or new concerns. Denies SOB or unexplained weight gain.     3/31/2023 patient with no new concerns today.  She continues to work towards goals in therapy.  Requires wheelchair for mobility.  Denies constitutional symptoms.    4/4/23  patient continues to work in therapy due to weakness.  She is working on strengthening and requires a wheelchair for mobility.  No new issues or concerns.  No acute distress.  Denies shortness of breath.    4/5/23  Patient has been working in therapy to improve strength, endurance, and ADLs.  Patient continues to work toward goals.  No new concerns today.  Denies n/v/f/c pain.        Objective   Vital signs: 20, 135/70, 97.5, 66, 95%  Physical Exam  Constitutional:       General: She is not in acute distress.  Eyes:      Extraocular Movements: Extraocular movements intact.   Cardiovascular:      Rate and Rhythm: Regular rhythm.   Pulmonary:      Effort: Pulmonary effort is normal.      Breath sounds: Normal breath sounds.   Abdominal:      General: Bowel sounds are normal.      Palpations: Abdomen is soft.   Genitourinary:     Comments: Bradley  Musculoskeletal:      Cervical back: Neck supple.      Right lower leg: Edema present.      Left lower leg: Edema present.      Comments: Generalized weakness     Neurological:      Mental Status: She is alert.   Psychiatric:         Mood and Affect: Mood normal.         Behavior: Behavior is cooperative.         Assessment/Plan   Problem List Items Addressed This Visit       Chronic cholecystitis     Status post laparoscopic cholecystectomy  No n/v today  Cont to  monitor         Chronic diastolic heart failure (CMS/HCC)     Stable, no sob   Cont Diuretic  Monitor weight         Hypertension, essential     Controlled, BP at goal  Continue antihypertensives  Continue to monitor blood pressure         Mild late onset Alzheimer's dementia without behavioral disturbance, psychotic disturbance, mood disturbance, or anxiety (CMS/HCC)    Weakness - Primary     Continue with therapy          Medications, treatments, and labs reviewed  Continue medications and treatments as listed in PCC    Scribe Attestation  Fiorella CHAUDHRY Scribe   attest that this documentation has been prepared under the direction and in the presence of KAIDEN Banda    Provider Attestation - Scribe documentation  All medical record entries made by the Scribe were at my direction and personally dictated by me. I have reviewed the chart and agree that the record accurately reflects my personal performance of the history, physical exam, discussion and plan.   KAIDEN Banda

## 2023-08-04 ENCOUNTER — OFFICE VISIT (OUTPATIENT)
Dept: CARDIOLOGY CLINIC | Age: 78
End: 2023-08-04
Payer: MEDICARE

## 2023-08-04 VITALS
HEIGHT: 68 IN | SYSTOLIC BLOOD PRESSURE: 122 MMHG | RESPIRATION RATE: 12 BRPM | BODY MASS INDEX: 30.01 KG/M2 | HEART RATE: 62 BPM | DIASTOLIC BLOOD PRESSURE: 84 MMHG | WEIGHT: 198 LBS

## 2023-08-04 DIAGNOSIS — I25.119 CORONARY ARTERY DISEASE WITH ANGINA PECTORIS, UNSPECIFIED VESSEL OR LESION TYPE, UNSPECIFIED WHETHER NATIVE OR TRANSPLANTED HEART (HCC): Primary | Chronic | ICD-10-CM

## 2023-08-04 PROCEDURE — 93000 ELECTROCARDIOGRAM COMPLETE: CPT | Performed by: INTERNAL MEDICINE

## 2023-08-04 PROCEDURE — 3074F SYST BP LT 130 MM HG: CPT | Performed by: INTERNAL MEDICINE

## 2023-08-04 PROCEDURE — 1123F ACP DISCUSS/DSCN MKR DOCD: CPT | Performed by: INTERNAL MEDICINE

## 2023-08-04 PROCEDURE — G8427 DOCREV CUR MEDS BY ELIG CLIN: HCPCS | Performed by: INTERNAL MEDICINE

## 2023-08-04 PROCEDURE — 99213 OFFICE O/P EST LOW 20 MIN: CPT | Performed by: INTERNAL MEDICINE

## 2023-08-04 PROCEDURE — 3079F DIAST BP 80-89 MM HG: CPT | Performed by: INTERNAL MEDICINE

## 2023-08-04 PROCEDURE — 1036F TOBACCO NON-USER: CPT | Performed by: INTERNAL MEDICINE

## 2023-08-04 PROCEDURE — 3017F COLORECTAL CA SCREEN DOC REV: CPT | Performed by: INTERNAL MEDICINE

## 2023-08-04 PROCEDURE — G8417 CALC BMI ABV UP PARAM F/U: HCPCS | Performed by: INTERNAL MEDICINE

## 2023-08-04 RX ORDER — CARVEDILOL 3.12 MG/1
3.12 TABLET ORAL 2 TIMES DAILY WITH MEALS
Qty: 180 TABLET | Refills: 3 | Status: SHIPPED | OUTPATIENT
Start: 2023-08-04

## 2023-08-04 RX ORDER — IBUPROFEN 800 MG/1
TABLET ORAL
COMMUNITY
Start: 2023-07-02

## 2023-08-04 RX ORDER — CLOPIDOGREL BISULFATE 75 MG/1
75 TABLET ORAL DAILY
Qty: 90 TABLET | Refills: 3 | Status: SHIPPED | OUTPATIENT
Start: 2023-08-04

## 2023-08-04 NOTE — PROGRESS NOTES
Tobacco Use    Smoking status: Never    Smokeless tobacco: Never   Vaping Use    Vaping Use: Never used   Substance and Sexual Activity    Alcohol use: Yes     Alcohol/week: 1.0 standard drink     Types: 1 Cans of beer per week     Comment: socially    Drug use: No       Current Outpatient Medications   Medication Sig Dispense Refill     MG tablet TAKE ONE (1) TABLET BY MOUTH 3 TIMES A DAY WITH FOOD  IF NEEDED. clopidogrel (PLAVIX) 75 MG tablet TAKE 1 TABLET EVERY DAY 90 tablet 3    carvedilol (COREG) 3.125 MG tablet TAKE 1 TABLET TWICE DAILY WITH MEALS 180 tablet 3    nitroGLYCERIN (NITROSTAT) 0.4 MG SL tablet Place 1 tablet under the tongue every 5 minutes as needed (CP) 25 tablet 3    atorvastatin (LIPITOR) 20 MG tablet 1 tablet daily      fluticasone (FLONASE) 50 MCG/ACT nasal spray as needed       vitamin C (ASCORBIC ACID) 500 MG tablet Take 1 tablet by mouth daily      vitamin D (CHOLECALCIFEROL) 1000 UNIT TABS tablet Take 1 tablet by mouth daily      Multiple Vitamin TABS Take by mouth      Flaxseed, Linseed, (FLAX SEED OIL PO) Take 1,000 mg by mouth 2 times daily LD 2/15/16       No current facility-administered medications for this visit. No Known Allergies    Chief Complaint:  Obdulio Mackey is here today for follow up and management/recomendations for CAD, ICM, hypercholesterolemia. History of Present Illness: Obdulio Mackey states that He does house work, yard work, goes up the stairs & goes shopping. He also performs strenuous activities taking care of his 3 acres and goes for walks 1/2-3/4 of a mile including up steep grades. He also recently cut down a tree. He states that when it is very hot he fatigues more easily. 2 weeks ago he went on a fishing trip and states that after sitting for long periods of time he noticed some mild right-sided ankle swelling. This has not recurred. Per review of his notes, he has mild chronic ankle edema.   He denies any CP or dyspnea

## 2024-01-25 ENCOUNTER — HOSPITAL ENCOUNTER (OUTPATIENT)
Age: 79
Discharge: HOME | End: 2024-01-27
Payer: MEDICARE

## 2024-01-25 ENCOUNTER — HOSPITAL ENCOUNTER (OUTPATIENT)
Dept: GENERAL RADIOLOGY | Age: 79
Discharge: HOME | End: 2024-01-27
Payer: MEDICARE

## 2024-01-25 DIAGNOSIS — N20.0 CALCULUS OF KIDNEY: ICD-10-CM

## 2024-01-25 PROCEDURE — 74018 RADEX ABDOMEN 1 VIEW: CPT

## 2024-02-19 ENCOUNTER — HOSPITAL ENCOUNTER (OUTPATIENT)
Dept: ULTRASOUND IMAGING | Age: 79
Discharge: HOME | End: 2024-02-21
Payer: MEDICARE

## 2024-02-19 ENCOUNTER — HOSPITAL ENCOUNTER (OUTPATIENT)
Age: 79
Discharge: HOME | End: 2024-02-21
Payer: MEDICARE

## 2024-02-19 DIAGNOSIS — M79.661 PAIN OF RIGHT CALF: ICD-10-CM

## 2024-02-19 PROCEDURE — 93971 EXTREMITY STUDY: CPT

## 2024-05-01 ENCOUNTER — OFFICE VISIT (OUTPATIENT)
Dept: CARDIOLOGY CLINIC | Age: 79
End: 2024-05-01
Payer: MEDICARE

## 2024-05-01 VITALS
HEIGHT: 68 IN | RESPIRATION RATE: 18 BRPM | BODY MASS INDEX: 28.64 KG/M2 | SYSTOLIC BLOOD PRESSURE: 118 MMHG | DIASTOLIC BLOOD PRESSURE: 64 MMHG | HEART RATE: 70 BPM | WEIGHT: 189 LBS

## 2024-05-01 DIAGNOSIS — I25.119 CORONARY ARTERY DISEASE WITH ANGINA PECTORIS, UNSPECIFIED VESSEL OR LESION TYPE, UNSPECIFIED WHETHER NATIVE OR TRANSPLANTED HEART (HCC): Primary | Chronic | ICD-10-CM

## 2024-05-01 DIAGNOSIS — R01.1 MURMUR: ICD-10-CM

## 2024-05-01 DIAGNOSIS — R60.0 LOWER EXTREMITY EDEMA: ICD-10-CM

## 2024-05-01 PROCEDURE — G8427 DOCREV CUR MEDS BY ELIG CLIN: HCPCS | Performed by: INTERNAL MEDICINE

## 2024-05-01 PROCEDURE — 1123F ACP DISCUSS/DSCN MKR DOCD: CPT | Performed by: INTERNAL MEDICINE

## 2024-05-01 PROCEDURE — 99214 OFFICE O/P EST MOD 30 MIN: CPT | Performed by: INTERNAL MEDICINE

## 2024-05-01 PROCEDURE — 3078F DIAST BP <80 MM HG: CPT | Performed by: INTERNAL MEDICINE

## 2024-05-01 PROCEDURE — 1036F TOBACCO NON-USER: CPT | Performed by: INTERNAL MEDICINE

## 2024-05-01 PROCEDURE — 3074F SYST BP LT 130 MM HG: CPT | Performed by: INTERNAL MEDICINE

## 2024-05-01 PROCEDURE — 93000 ELECTROCARDIOGRAM COMPLETE: CPT | Performed by: INTERNAL MEDICINE

## 2024-05-01 PROCEDURE — G8417 CALC BMI ABV UP PARAM F/U: HCPCS | Performed by: INTERNAL MEDICINE

## 2024-05-01 RX ORDER — NITROGLYCERIN 0.4 MG/1
0.4 TABLET SUBLINGUAL EVERY 5 MIN PRN
Qty: 25 TABLET | Refills: 3 | Status: SHIPPED | OUTPATIENT
Start: 2024-05-01

## 2024-05-01 NOTE — PROGRESS NOTES
Shayne Joyner  1945  Date of Service: 5/1/2024    Patient Active Problem List    Diagnosis Date Noted    Ulcerative (chronic) enterocolitis (HCC) 01/04/2022     Priority: Medium    Severe protein-calorie malnutrition (HCC) 11/04/2021     Priority: Medium    Rupture of distal biceps tendon 01/13/2017     Priority: Medium    Fracture of multiple ribs of right side with routine healing 12/09/2016     Priority: Medium    Contusion of right forearm 12/08/2016     Priority: Medium    Weakness 12/08/2016     Priority: Medium    Difficulty walking 11/21/2016     Priority: Medium    Essential hypertension 10/13/2016     Priority: Medium    Knee pain 10/13/2016     Priority: Medium    S/P CABG x 4 10/13/2016     Priority: Medium    S/P coronary artery stent placement 10/13/2016     Priority: Medium    History of artificial joint 11/23/2010     Priority: Medium    Osteoarthritis of knee 11/12/2009     Priority: Medium    Mixed hyperlipidemia 06/04/2013    CAD (coronary artery disease)      Overview Note:     Non Q-wave inferoposterior myocardial infarction (6/7/08).   Cardiac catheterization (6/7/08).  LAD proximal 80%, distal 99%.  D1 1.8 mm vessel ostial 99%.  Circumflex distal 80%.  OM 3 ostial 100%.  RCA distal 99% at the bifurcation of PDA posterior lateral branch.  Urgent coronary artery bypass surgery by Dr. Candelaria (6/7/08).  LIMA to the LAD.      Heart cath (11-12-12)  LIMA - LAD patent  SVG - OM2: patent  SVG - PLB: occluded  SVG - PDA: occluded  Apical LAD: 85 & 95% lesions (1.5 mm vessel)    2.5x14 Resolute MARQUES distal RCA  SVG to the OM3.  SVG to the PDA.  SVG to the posterior lateral branch.    6-13 cath stent patent      Ischemic cardiomyopathy      Overview Note:     Lateral, but preserved LV systolic function         Social History     Socioeconomic History    Marital status:      Spouse name: None    Number of children: None    Years of education: None    Highest education level: None

## 2024-05-02 ENCOUNTER — TELEPHONE (OUTPATIENT)
Dept: CARDIOLOGY | Age: 79
End: 2024-05-02

## 2024-05-02 NOTE — TELEPHONE ENCOUNTER
CALLED PATIENT AND LEFT MESSAGE TO SCHEDULE ECHO    Electronically signed by Crissy Preston on 5/2/2024 at 2:36 PM

## 2024-06-24 ENCOUNTER — HOSPITAL ENCOUNTER (OUTPATIENT)
Dept: CARDIOLOGY | Age: 79
Discharge: HOME | End: 2024-06-26
Attending: INTERNAL MEDICINE
Payer: MEDICARE

## 2024-06-24 VITALS
HEIGHT: 68 IN | WEIGHT: 198 LBS | SYSTOLIC BLOOD PRESSURE: 118 MMHG | BODY MASS INDEX: 30.01 KG/M2 | DIASTOLIC BLOOD PRESSURE: 64 MMHG

## 2024-06-24 DIAGNOSIS — R01.1 MURMUR: ICD-10-CM

## 2024-06-24 DIAGNOSIS — R60.0 LOWER EXTREMITY EDEMA: ICD-10-CM

## 2024-06-24 LAB
ECHO AO ASC DIAM: 3.2 CM
ECHO AO ASCENDING AORTA INDEX: 1.57 CM/M2
ECHO AV AREA PEAK VELOCITY: 2.7 CM2
ECHO AV AREA VTI: 3.1 CM2
ECHO AV AREA/BSA PEAK VELOCITY: 1.3 CM2/M2
ECHO AV AREA/BSA VTI: 1.5 CM2/M2
ECHO AV CUSP MM: 1.9 CM
ECHO AV MEAN GRADIENT: 4 MMHG
ECHO AV MEAN VELOCITY: 1 M/S
ECHO AV PEAK GRADIENT: 9 MMHG
ECHO AV PEAK VELOCITY: 1.5 M/S
ECHO AV VELOCITY RATIO: 0.73
ECHO AV VTI: 29.6 CM
ECHO BSA: 2.08 M2
ECHO EST RA PRESSURE: 3 MMHG
ECHO LA DIAMETER INDEX: 2.16 CM/M2
ECHO LA DIAMETER: 4.4 CM
ECHO LA VOL A-L A2C: 48 ML (ref 18–58)
ECHO LA VOL A-L A4C: 51 ML (ref 18–58)
ECHO LA VOL MOD A2C: 48 ML (ref 18–58)
ECHO LA VOL MOD A4C: 51 ML (ref 18–58)
ECHO LA VOLUME AREA LENGTH: 53 ML
ECHO LA VOLUME INDEX A-L A2C: 24 ML/M2 (ref 16–34)
ECHO LA VOLUME INDEX A-L A4C: 25 ML/M2 (ref 16–34)
ECHO LA VOLUME INDEX AREA LENGTH: 26 ML/M2 (ref 16–34)
ECHO LA VOLUME INDEX MOD A2C: 24 ML/M2 (ref 16–34)
ECHO LA VOLUME INDEX MOD A4C: 25 ML/M2 (ref 16–34)
ECHO LV EF PHYSICIAN: 60 %
ECHO LV FRACTIONAL SHORTENING: 30 % (ref 28–44)
ECHO LV INTERNAL DIMENSION DIASTOLE INDEX: 2.45 CM/M2
ECHO LV INTERNAL DIMENSION DIASTOLIC: 5 CM (ref 4.2–5.9)
ECHO LV INTERNAL DIMENSION SYSTOLIC INDEX: 1.72 CM/M2
ECHO LV INTERNAL DIMENSION SYSTOLIC: 3.5 CM
ECHO LV ISOVOLUMETRIC RELAXATION TIME (IVRT): 69.2 MS
ECHO LV IVSD: 0.8 CM (ref 0.6–1)
ECHO LV IVSS: 1 CM
ECHO LV MASS 2D: 135.8 G (ref 88–224)
ECHO LV MASS INDEX 2D: 66.6 G/M2 (ref 49–115)
ECHO LV POSTERIOR WALL DIASTOLIC: 0.8 CM (ref 0.6–1)
ECHO LV POSTERIOR WALL SYSTOLIC: 1 CM
ECHO LV RELATIVE WALL THICKNESS RATIO: 0.32
ECHO LVOT AREA: 3.8 CM2
ECHO LVOT AV VTI INDEX: 0.82
ECHO LVOT DIAM: 2.2 CM
ECHO LVOT MEAN GRADIENT: 2 MMHG
ECHO LVOT PEAK GRADIENT: 5 MMHG
ECHO LVOT PEAK VELOCITY: 1.1 M/S
ECHO LVOT STROKE VOLUME INDEX: 45.4 ML/M2
ECHO LVOT SV: 92.7 ML
ECHO LVOT VTI: 24.4 CM
ECHO MV "A" WAVE DURATION: 78.4 MSEC
ECHO MV A VELOCITY: 0.61 M/S
ECHO MV AREA PHT: 2.5 CM2
ECHO MV AREA VTI: 3.1 CM2
ECHO MV E DECELERATION TIME (DT): 141.7 MS
ECHO MV E VELOCITY: 0.92 M/S
ECHO MV E/A RATIO: 1.51
ECHO MV LVOT VTI INDEX: 1.21
ECHO MV MAX VELOCITY: 0.9 M/S
ECHO MV MEAN GRADIENT: 1 MMHG
ECHO MV MEAN VELOCITY: 0.5 M/S
ECHO MV PEAK GRADIENT: 3 MMHG
ECHO MV PRESSURE HALF TIME (PHT): 88.6 MS
ECHO MV VTI: 29.6 CM
ECHO PULMONARY ARTERY END DIASTOLIC PRESSURE: 6 MMHG
ECHO PV MAX VELOCITY: 1.2 M/S
ECHO PV MEAN GRADIENT: 3 MMHG
ECHO PV MEAN VELOCITY: 0.7 M/S
ECHO PV PEAK GRADIENT: 6 MMHG
ECHO PV REGURGITANT MAX VELOCITY: 1.2 M/S
ECHO PV VTI: 25.4 CM
ECHO PVEIN A DURATION: 73.8 MS
ECHO PVEIN A VELOCITY: 0.3 M/S
ECHO PVEIN PEAK D VELOCITY: 1.1 M/S
ECHO PVEIN PEAK S VELOCITY: 0.9 M/S
ECHO PVEIN S/D RATIO: 0.8
ECHO RIGHT VENTRICULAR SYSTOLIC PRESSURE (RVSP): 31 MMHG
ECHO TV REGURGITANT MAX VELOCITY: 2.65 M/S
ECHO TV REGURGITANT PEAK GRADIENT: 28 MMHG

## 2024-06-24 PROCEDURE — 93306 TTE W/DOPPLER COMPLETE: CPT | Performed by: INTERNAL MEDICINE

## 2024-06-24 PROCEDURE — 93306 TTE W/DOPPLER COMPLETE: CPT

## 2024-06-25 ENCOUNTER — TELEPHONE (OUTPATIENT)
Dept: CARDIOLOGY CLINIC | Age: 79
End: 2024-06-25

## 2024-06-25 NOTE — TELEPHONE ENCOUNTER
----- Message from Van Aguilar, DO sent at 6/24/2024  5:42 PM EDT -----  Let him know that his heart function and valves are good.

## 2024-07-08 ENCOUNTER — HOSPITAL ENCOUNTER (OUTPATIENT)
Dept: GENERAL RADIOLOGY | Age: 79
Discharge: HOME | End: 2024-07-10
Payer: MEDICARE

## 2024-07-08 ENCOUNTER — HOSPITAL ENCOUNTER (OUTPATIENT)
Age: 79
Discharge: HOME | End: 2024-07-10
Payer: MEDICARE

## 2024-07-08 DIAGNOSIS — N20.0 CALCULUS OF KIDNEY: ICD-10-CM

## 2024-07-08 PROCEDURE — 74018 RADEX ABDOMEN 1 VIEW: CPT

## 2024-07-23 RX ORDER — CARVEDILOL 3.12 MG/1
3.12 TABLET ORAL 2 TIMES DAILY WITH MEALS
Qty: 180 TABLET | Refills: 3 | Status: SHIPPED | OUTPATIENT
Start: 2024-07-23

## 2024-07-23 RX ORDER — CLOPIDOGREL BISULFATE 75 MG/1
75 TABLET ORAL DAILY
Qty: 90 TABLET | Refills: 3 | Status: SHIPPED | OUTPATIENT
Start: 2024-07-23

## 2024-11-01 ENCOUNTER — OFFICE VISIT (OUTPATIENT)
Dept: CARDIOLOGY CLINIC | Age: 79
End: 2024-11-01

## 2024-11-01 VITALS
BODY MASS INDEX: 29.86 KG/M2 | DIASTOLIC BLOOD PRESSURE: 74 MMHG | WEIGHT: 197 LBS | HEART RATE: 59 BPM | RESPIRATION RATE: 18 BRPM | HEIGHT: 68 IN | SYSTOLIC BLOOD PRESSURE: 120 MMHG

## 2024-11-01 DIAGNOSIS — I25.119 CORONARY ARTERY DISEASE WITH ANGINA PECTORIS, UNSPECIFIED VESSEL OR LESION TYPE, UNSPECIFIED WHETHER NATIVE OR TRANSPLANTED HEART (HCC): Primary | Chronic | ICD-10-CM

## 2024-11-01 NOTE — PROGRESS NOTES
Shayne Joyner  1945  Date of Service: 11/1/2024    Patient Active Problem List    Diagnosis Date Noted    Ulcerative (chronic) enterocolitis (HCC) 01/04/2022     Priority: Medium    Severe protein-calorie malnutrition (HCC) 11/04/2021     Priority: Medium    Rupture of distal biceps tendon 01/13/2017     Priority: Medium    Fracture of multiple ribs of right side with routine healing 12/09/2016     Priority: Medium    Contusion of right forearm 12/08/2016     Priority: Medium    Weakness 12/08/2016     Priority: Medium    Difficulty walking 11/21/2016     Priority: Medium    Essential hypertension 10/13/2016     Priority: Medium    Knee pain 10/13/2016     Priority: Medium    S/P CABG x 4 10/13/2016     Priority: Medium    S/P coronary artery stent placement 10/13/2016     Priority: Medium    History of artificial joint 11/23/2010     Priority: Medium    Osteoarthritis of knee 11/12/2009     Priority: Medium    Mixed hyperlipidemia 06/04/2013    CAD (coronary artery disease)      Overview Note:     Non Q-wave inferoposterior myocardial infarction (6/7/08).   Cardiac catheterization (6/7/08).  LAD proximal 80%, distal 99%.  D1 1.8 mm vessel ostial 99%.  Circumflex distal 80%.  OM 3 ostial 100%.  RCA distal 99% at the bifurcation of PDA posterior lateral branch.  Urgent coronary artery bypass surgery by Dr. Candelaria (6/7/08).  LIMA to the LAD.      Heart cath (11-12-12)  LIMA - LAD patent  SVG - OM2: patent  SVG - PLB: occluded  SVG - PDA: occluded  Apical LAD: 85 & 95% lesions (1.5 mm vessel)    2.5x14 Resolute MARQUES distal RCA  SVG to the OM3.  SVG to the PDA.  SVG to the posterior lateral branch.    6-13 cath stent patent      Ischemic cardiomyopathy      Overview Note:     Lateral, but preserved LV systolic function         Social History     Socioeconomic History    Marital status:      Spouse name: None    Number of children: None    Years of education: None    Highest education level: None

## 2025-01-13 ENCOUNTER — HOSPITAL ENCOUNTER (OUTPATIENT)
Age: 80
Discharge: HOME | End: 2025-01-15
Payer: MEDICARE

## 2025-01-13 ENCOUNTER — HOSPITAL ENCOUNTER (OUTPATIENT)
Dept: GENERAL RADIOLOGY | Age: 80
Discharge: HOME | End: 2025-01-15
Payer: MEDICARE

## 2025-01-13 DIAGNOSIS — N20.0 CALCULUS OF KIDNEY: ICD-10-CM

## 2025-01-13 PROCEDURE — 74018 RADEX ABDOMEN 1 VIEW: CPT

## 2025-05-05 ENCOUNTER — OFFICE VISIT (OUTPATIENT)
Dept: CARDIOLOGY CLINIC | Age: 80
End: 2025-05-05
Payer: MEDICARE

## 2025-05-05 VITALS
BODY MASS INDEX: 30.92 KG/M2 | HEIGHT: 67 IN | WEIGHT: 197 LBS | HEART RATE: 64 BPM | TEMPERATURE: 97 F | DIASTOLIC BLOOD PRESSURE: 60 MMHG | RESPIRATION RATE: 18 BRPM | OXYGEN SATURATION: 95 % | SYSTOLIC BLOOD PRESSURE: 106 MMHG

## 2025-05-05 DIAGNOSIS — I25.119 CORONARY ARTERY DISEASE WITH ANGINA PECTORIS, UNSPECIFIED VESSEL OR LESION TYPE, UNSPECIFIED WHETHER NATIVE OR TRANSPLANTED HEART: Primary | ICD-10-CM

## 2025-05-05 PROCEDURE — 99214 OFFICE O/P EST MOD 30 MIN: CPT | Performed by: INTERNAL MEDICINE

## 2025-05-05 PROCEDURE — G8427 DOCREV CUR MEDS BY ELIG CLIN: HCPCS | Performed by: INTERNAL MEDICINE

## 2025-05-05 PROCEDURE — 1159F MED LIST DOCD IN RCRD: CPT | Performed by: INTERNAL MEDICINE

## 2025-05-05 PROCEDURE — 1123F ACP DISCUSS/DSCN MKR DOCD: CPT | Performed by: INTERNAL MEDICINE

## 2025-05-05 PROCEDURE — G8417 CALC BMI ABV UP PARAM F/U: HCPCS | Performed by: INTERNAL MEDICINE

## 2025-05-05 PROCEDURE — 3078F DIAST BP <80 MM HG: CPT | Performed by: INTERNAL MEDICINE

## 2025-05-05 PROCEDURE — G2211 COMPLEX E/M VISIT ADD ON: HCPCS | Performed by: INTERNAL MEDICINE

## 2025-05-05 PROCEDURE — 3074F SYST BP LT 130 MM HG: CPT | Performed by: INTERNAL MEDICINE

## 2025-05-05 PROCEDURE — 93000 ELECTROCARDIOGRAM COMPLETE: CPT | Performed by: INTERNAL MEDICINE

## 2025-05-05 PROCEDURE — 1036F TOBACCO NON-USER: CPT | Performed by: INTERNAL MEDICINE

## 2025-05-05 RX ORDER — CLOPIDOGREL BISULFATE 75 MG/1
75 TABLET ORAL DAILY
Qty: 90 TABLET | Refills: 3 | Status: SHIPPED | OUTPATIENT
Start: 2025-05-05

## 2025-05-05 RX ORDER — CARVEDILOL 3.12 MG/1
3.12 TABLET ORAL 2 TIMES DAILY WITH MEALS
Qty: 180 TABLET | Refills: 3 | Status: SHIPPED | OUTPATIENT
Start: 2025-05-05

## 2025-05-05 RX ORDER — NITROGLYCERIN 0.4 MG/1
0.4 TABLET SUBLINGUAL EVERY 5 MIN PRN
Qty: 25 TABLET | Refills: 3 | Status: SHIPPED | OUTPATIENT
Start: 2025-05-05 | End: 2025-05-08 | Stop reason: SDUPTHER

## 2025-05-05 NOTE — PROGRESS NOTES
Shayne Joyner  1945  Date of Service: 5/5/2025    Patient Active Problem List    Diagnosis Date Noted    Ulcerative (chronic) enterocolitis (HCC) 01/04/2022     Priority: Medium    Severe protein-calorie malnutrition 11/04/2021     Priority: Medium    Rupture of distal biceps tendon 01/13/2017     Priority: Medium    Fracture of multiple ribs of right side with routine healing 12/09/2016     Priority: Medium    Contusion of right forearm 12/08/2016     Priority: Medium    Weakness 12/08/2016     Priority: Medium    Difficulty walking 11/21/2016     Priority: Medium    Essential hypertension 10/13/2016     Priority: Medium    Knee pain 10/13/2016     Priority: Medium    S/P CABG x 4 10/13/2016     Priority: Medium    S/P coronary artery stent placement 10/13/2016     Priority: Medium    History of artificial joint 11/23/2010     Priority: Medium    Osteoarthritis of knee 11/12/2009     Priority: Medium    Mixed hyperlipidemia 06/04/2013    CAD (coronary artery disease)      Overview Note:     Non Q-wave inferoposterior myocardial infarction (6/7/08).   Cardiac catheterization (6/7/08).  LAD proximal 80%, distal 99%.  D1 1.8 mm vessel ostial 99%.  Circumflex distal 80%.  OM 3 ostial 100%.  RCA distal 99% at the bifurcation of PDA posterior lateral branch.  Urgent coronary artery bypass surgery by Dr. Candelaria (6/7/08).  LIMA to the LAD.      Heart cath (11-12-12)  LIMA - LAD patent  SVG - OM2: patent  SVG - PLB: occluded  SVG - PDA: occluded  Apical LAD: 85 & 95% lesions (1.5 mm vessel)    2.5x14 Resolute MARQUES distal RCA  SVG to the OM3.  SVG to the PDA.  SVG to the posterior lateral branch.    6-13 cath stent patent      Ischemic cardiomyopathy      Overview Note:     Lateral, but preserved LV systolic function         Social History     Socioeconomic History    Marital status:      Spouse name: None    Number of children: None    Years of education: None    Highest education level: None   Tobacco

## 2025-05-08 RX ORDER — NITROGLYCERIN 0.4 MG/1
0.4 TABLET SUBLINGUAL EVERY 5 MIN PRN
Qty: 25 TABLET | Refills: 0 | Status: SHIPPED | OUTPATIENT
Start: 2025-05-08

## 2025-07-23 ENCOUNTER — TRANSCRIBE ORDERS (OUTPATIENT)
Dept: GENERAL RADIOLOGY | Age: 80
End: 2025-07-23

## 2025-07-23 ENCOUNTER — HOSPITAL ENCOUNTER (OUTPATIENT)
Dept: GENERAL RADIOLOGY | Age: 80
Discharge: HOME | End: 2025-07-25
Payer: MEDICARE

## 2025-07-23 DIAGNOSIS — N20.0 KIDNEY STONE: ICD-10-CM

## 2025-07-23 DIAGNOSIS — N20.0 KIDNEY STONE: Primary | ICD-10-CM

## 2025-07-23 PROCEDURE — 74018 RADEX ABDOMEN 1 VIEW: CPT

## 2025-09-06 ENCOUNTER — APPOINTMENT (OUTPATIENT)
Dept: GENERAL RADIOLOGY | Age: 80
End: 2025-09-06
Payer: MEDICARE

## 2025-09-06 ENCOUNTER — HOSPITAL ENCOUNTER (EMERGENCY)
Age: 80
Discharge: HOME | End: 2025-09-06
Attending: EMERGENCY MEDICINE
Payer: MEDICARE

## 2025-09-06 VITALS
TEMPERATURE: 97.9 F | SYSTOLIC BLOOD PRESSURE: 140 MMHG | OXYGEN SATURATION: 96 % | HEART RATE: 59 BPM | DIASTOLIC BLOOD PRESSURE: 72 MMHG | RESPIRATION RATE: 16 BRPM

## 2025-09-06 DIAGNOSIS — R07.9 CHEST PAIN, UNSPECIFIED TYPE: Primary | ICD-10-CM

## 2025-09-06 LAB
ALBUMIN SERPL-MCNC: 3.7 G/DL (ref 3.5–5.2)
ALP SERPL-CCNC: 64 U/L (ref 40–129)
ALT SERPL-CCNC: 20 U/L (ref 0–50)
ANION GAP SERPL CALCULATED.3IONS-SCNC: 11 MMOL/L (ref 7–16)
AST SERPL-CCNC: 19 U/L (ref 0–50)
BASOPHILS # BLD: 0.01 K/UL (ref 0–0.2)
BASOPHILS NFR BLD: 0 % (ref 0–2)
BILIRUB SERPL-MCNC: 0.6 MG/DL (ref 0–1.2)
BUN SERPL-MCNC: 23 MG/DL (ref 8–23)
CALCIUM SERPL-MCNC: 9.3 MG/DL (ref 8.8–10.2)
CHLORIDE SERPL-SCNC: 108 MMOL/L (ref 98–107)
CO2 SERPL-SCNC: 22 MMOL/L (ref 22–29)
CREAT SERPL-MCNC: 1 MG/DL (ref 0.7–1.2)
EOSINOPHIL # BLD: 0.04 K/UL (ref 0.05–0.5)
EOSINOPHILS RELATIVE PERCENT: 1 % (ref 0–6)
ERYTHROCYTE [DISTWIDTH] IN BLOOD BY AUTOMATED COUNT: 13.9 % (ref 11.5–15)
GFR, ESTIMATED: 74 ML/MIN/1.73M2
GLUCOSE SERPL-MCNC: 147 MG/DL (ref 74–99)
HCT VFR BLD AUTO: 39.9 % (ref 37–54)
HGB BLD-MCNC: 13.4 G/DL (ref 12.5–16.5)
IMM GRANULOCYTES # BLD AUTO: <0.03 K/UL (ref 0–0.58)
IMM GRANULOCYTES NFR BLD: 0 % (ref 0–5)
LIPASE SERPL-CCNC: 27 U/L (ref 13–60)
LYMPHOCYTES NFR BLD: 1.43 K/UL (ref 1.5–4)
LYMPHOCYTES RELATIVE PERCENT: 20 % (ref 20–42)
MCH RBC QN AUTO: 30.2 PG (ref 26–35)
MCHC RBC AUTO-ENTMCNC: 33.6 G/DL (ref 32–34.5)
MCV RBC AUTO: 90.1 FL (ref 80–99.9)
MONOCYTES NFR BLD: 0.53 K/UL (ref 0.1–0.95)
MONOCYTES NFR BLD: 7 % (ref 2–12)
NEUTROPHILS NFR BLD: 72 % (ref 43–80)
NEUTS SEG NFR BLD: 5.17 K/UL (ref 1.8–7.3)
PLATELET # BLD AUTO: 167 K/UL (ref 130–450)
PMV BLD AUTO: 10.3 FL (ref 7–12)
POTASSIUM SERPL-SCNC: 4.6 MMOL/L (ref 3.5–5.1)
PROT SERPL-MCNC: 6.1 G/DL (ref 6.4–8.3)
RBC # BLD AUTO: 4.43 M/UL (ref 3.8–5.8)
SODIUM SERPL-SCNC: 140 MMOL/L (ref 136–145)
TROPONIN I SERPL HS-MCNC: 13 NG/L (ref 0–22)
TROPONIN I SERPL HS-MCNC: 15 NG/L (ref 0–22)
WBC OTHER # BLD: 7.2 K/UL (ref 4.5–11.5)

## 2025-09-06 PROCEDURE — APPSS180 APP SPLIT SHARED TIME > 60 MINUTES: Performed by: NURSE PRACTITIONER

## 2025-09-06 PROCEDURE — 80053 COMPREHEN METABOLIC PANEL: CPT

## 2025-09-06 PROCEDURE — 71045 X-RAY EXAM CHEST 1 VIEW: CPT

## 2025-09-06 PROCEDURE — 6370000000 HC RX 637 (ALT 250 FOR IP): Performed by: EMERGENCY MEDICINE

## 2025-09-06 PROCEDURE — 83690 ASSAY OF LIPASE: CPT

## 2025-09-06 PROCEDURE — 84484 ASSAY OF TROPONIN QUANT: CPT

## 2025-09-06 PROCEDURE — 85025 COMPLETE CBC W/AUTO DIFF WBC: CPT

## 2025-09-06 RX ORDER — ASPIRIN 81 MG/1
324 TABLET, CHEWABLE ORAL ONCE
Status: DISCONTINUED | OUTPATIENT
Start: 2025-09-06 | End: 2025-09-06 | Stop reason: HOSPADM

## 2025-09-06 RX ORDER — ACETAMINOPHEN 325 MG/1
650 TABLET ORAL ONCE
Status: COMPLETED | OUTPATIENT
Start: 2025-09-06 | End: 2025-09-06

## 2025-09-06 RX ORDER — NITROGLYCERIN 0.4 MG/1
0.4 TABLET SUBLINGUAL EVERY 5 MIN PRN
Qty: 25 TABLET | Refills: 0 | Status: SHIPPED | OUTPATIENT
Start: 2025-09-06

## 2025-09-06 RX ORDER — ISOSORBIDE MONONITRATE 30 MG/1
30 TABLET, EXTENDED RELEASE ORAL DAILY
Qty: 30 TABLET | Refills: 0 | Status: SHIPPED | OUTPATIENT
Start: 2025-09-06

## 2025-09-06 RX ADMIN — ACETAMINOPHEN 650 MG: 325 TABLET ORAL at 11:40

## 2025-09-06 ASSESSMENT — PAIN SCALES - GENERAL: PAINLEVEL_OUTOF10: 2

## 2025-09-06 ASSESSMENT — HEART SCORE: ECG: NON-SPECIFC REPOLARIZATION DISTURBANCE/LBTB/PM

## 2025-09-06 ASSESSMENT — PAIN - FUNCTIONAL ASSESSMENT: PAIN_FUNCTIONAL_ASSESSMENT: 0-10

## 2025-09-07 LAB
EKG ATRIAL RATE: 63 BPM
EKG P AXIS: 29 DEGREES
EKG P-R INTERVAL: 140 MS
EKG Q-T INTERVAL: 410 MS
EKG QRS DURATION: 86 MS
EKG QTC CALCULATION (BAZETT): 419 MS
EKG R AXIS: -5 DEGREES
EKG T AXIS: 78 DEGREES
EKG VENTRICULAR RATE: 63 BPM

## (undated) DEVICE — GRADUATE TRIANG MEASURE 1000ML BLK PRNT

## (undated) DEVICE — SPONGE GZ W4XL4IN RAYON POLY FILL CVR W/ NONWOVEN FAB